# Patient Record
Sex: FEMALE | Race: WHITE | URBAN - METROPOLITAN AREA
[De-identification: names, ages, dates, MRNs, and addresses within clinical notes are randomized per-mention and may not be internally consistent; named-entity substitution may affect disease eponyms.]

---

## 2023-05-31 ENCOUNTER — NURSING HOME VISIT (OUTPATIENT)
Dept: POST ACUTE CARE | Facility: EXTERNAL LOCATION | Age: 83
End: 2023-05-31
Payer: COMMERCIAL

## 2023-05-31 DIAGNOSIS — E78.2 MIXED HYPERLIPIDEMIA: Primary | ICD-10-CM

## 2023-05-31 DIAGNOSIS — I10 PRIMARY HYPERTENSION: ICD-10-CM

## 2023-05-31 DIAGNOSIS — F33.40 RECURRENT MAJOR DEPRESSIVE DISORDER, IN REMISSION (CMS-HCC): ICD-10-CM

## 2023-05-31 DIAGNOSIS — M06.9 RHEUMATOID ARTHRITIS INVOLVING MULTIPLE SITES, UNSPECIFIED WHETHER RHEUMATOID FACTOR PRESENT (MULTI): ICD-10-CM

## 2023-05-31 DIAGNOSIS — E03.8 OTHER SPECIFIED HYPOTHYROIDISM: ICD-10-CM

## 2023-05-31 PROCEDURE — 99305 1ST NF CARE MODERATE MDM 35: CPT | Performed by: INTERNAL MEDICINE

## 2023-05-31 NOTE — LETTER
"Patient: Brooke Lam  : 1940    Encounter Date: 2023    HISTORY & PHYSICAL    Subjective  Chief complaint: Brooke Lam is a 83 y.o. female who is a acute skilled care patient being seen and evaluated for multiple medical problems.  Patient presents for weakness.    HPI:  Patient was admitted to the hospital for chest pain and anxiety. Pt feels shaky and has a \"cloudy head\". Patient's zoloft was increased to 50 mg. Patient was discharged to skilled nursing facility for continued therapy.         Past Medical History:   Diagnosis Date   • HLD (hyperlipidemia)    • Hypertension, essential    • Hypothyroid    • RA (rheumatoid arthritis) (CMS/[facility])        No past surgical history on file.    No family history on file.    Social History     Socioeconomic History   • Marital status: Single     Spouse name: Not on file   • Number of children: Not on file   • Years of education: Not on file   • Highest education level: Not on file   Occupational History   • Not on file   Tobacco Use   • Smoking status: Not on file   • Smokeless tobacco: Not on file   Substance and Sexual Activity   • Alcohol use: Not on file   • Drug use: Not on file   • Sexual activity: Not on file   Other Topics Concern   • Not on file   Social History Narrative   • Not on file     Social Determinants of Health     Financial Resource Strain: Not on file   Food Insecurity: Not on file   Transportation Needs: Not on file   Physical Activity: Not on file   Stress: Not on file   Social Connections: Not on file   Intimate Partner Violence: Not on file   Housing Stability: Not on file       Vital signs: 122/76, 97.6, 79, 18, 96%    Objective  Physical Exam  HENT:      Head: Normocephalic and atraumatic.      Nose: Nose normal.      Mouth/Throat:      Mouth: Mucous membranes are moist.      Pharynx: Oropharynx is clear.   Eyes:      Extraocular Movements: Extraocular movements intact.      Pupils: Pupils are equal, round, and reactive to " light.   Cardiovascular:      Rate and Rhythm: Normal rate and regular rhythm.   Pulmonary:      Effort: No respiratory distress.      Breath sounds: Normal breath sounds. No wheezing, rhonchi or rales.   Abdominal:      General: Bowel sounds are normal. There is no distension.      Palpations: Abdomen is soft.      Tenderness: There is no abdominal tenderness. There is no guarding.   Musculoskeletal:      Right lower leg: No edema.      Left lower leg: No edema.   Skin:     General: Skin is warm and dry.   Neurological:      Mental Status: She is alert. Mental status is at baseline.   Psychiatric:         Mood and Affect: Mood normal.         Assessment/Plan  Problem List Items Addressed This Visit       Primary hypertension    Mixed hyperlipidemia - Primary    Other specified hypothyroidism    Rheumatoid arthritis involving multiple sites (CMS/[facility])    Recurrent major depressive disorder, in remission (CMS/[facility])     Medications, treatments, and labs reviewed  Continue medications and treatments as listed in [facility]    Scribe Attestation  Maria De Jesus ALY Scribe   attest that this documentation has been prepared under the direction and in the presence of Obinna Hopkins DO.    An interactive audio and/or video telecommunication system which permits real time communications between the patient (at the originating site) and provider (at a distant site) was utilized to provide this telehealth service after obtaining verbal consent.    Provider Attestation - Scribe documentation  All medical record entries made by the Scribe were at my direction and personally dictated by me. I have reviewed the chart and agree that the record accurately reflects my personal performance of the history, physical exam, discussion and plan.    Obinna Hopkins DO          Electronically Signed By: Obinna Hopkins DO   6/29/23  2:24 PM

## 2023-06-06 ENCOUNTER — NURSING HOME VISIT (OUTPATIENT)
Dept: POST ACUTE CARE | Facility: EXTERNAL LOCATION | Age: 83
End: 2023-06-06
Payer: COMMERCIAL

## 2023-06-06 DIAGNOSIS — F41.9 ANXIETY: Primary | ICD-10-CM

## 2023-06-06 PROCEDURE — 99309 SBSQ NF CARE MODERATE MDM 30: CPT | Performed by: REGISTERED NURSE

## 2023-06-06 NOTE — LETTER
"Patient: Brooke Lam  : 1940    Encounter Date: 2023    PROGRESS NOTE    Subjective  Chief complaint: Brooke Lam is a 83 y.o. female who is a long term care patient being seen and evaluated for anxiety.    HPI:   Patient with increase in anxiety.  Patient has diagnosis of dementia and is confused.  Nurse reports that patient is exit seeking and yells repeatedly \"I don't want to be here.  I want to go home\".      Objective  Vital signs:   18, 149/71, 97.2, 78, 98%  Physical Exam  Constitutional:       General: She is not in acute distress.  Eyes:      Extraocular Movements: Extraocular movements intact.   Pulmonary:      Effort: Pulmonary effort is normal.   Musculoskeletal:      Cervical back: Neck supple.   Neurological:      Mental Status: She is alert.   Psychiatric:         Mood and Affect: Mood normal.         Behavior: Behavior is cooperative.         Assessment/Plan  Problem List Items Addressed This Visit       Anxiety - Primary     Ativan 0.25mg prn           Medications, treatments, and labs reviewed  Continue medications and treatments as listed in EMR    Scribe Attestation  INina Scribe   attest that this documentation has been prepared under the direction and in the presence of REMA Sesay    Provider Attestation - Scribe documentation  All medical record entries made by the Scribe were at my direction and personally dictated by me. I have reviewed the chart and agree that the record accurately reflects my personal performance of the history, physical exam, discussion and plan.   REMA Sesay        Electronically Signed By: REMA Sesay   23 11:08 AM  "

## 2023-06-26 NOTE — PROGRESS NOTES
"HISTORY & PHYSICAL    Subjective   Chief complaint: Brooke Lam is a 83 y.o. female who is a acute skilled care patient being seen and evaluated for multiple medical problems.  Patient presents for weakness.    HPI:  Patient was admitted to the hospital for chest pain and anxiety. Pt feels shaky and has a \"cloudy head\". Patient's zoloft was increased to 50 mg. Patient was discharged to skilled nursing facility for continued therapy.         Past Medical History:   Diagnosis Date    HLD (hyperlipidemia)     Hypertension, essential     Hypothyroid     RA (rheumatoid arthritis) (CMS/[facility])        No past surgical history on file.    No family history on file.    Social History     Socioeconomic History    Marital status: Single     Spouse name: Not on file    Number of children: Not on file    Years of education: Not on file    Highest education level: Not on file   Occupational History    Not on file   Tobacco Use    Smoking status: Not on file    Smokeless tobacco: Not on file   Substance and Sexual Activity    Alcohol use: Not on file    Drug use: Not on file    Sexual activity: Not on file   Other Topics Concern    Not on file   Social History Narrative    Not on file     Social Determinants of Health     Financial Resource Strain: Not on file   Food Insecurity: Not on file   Transportation Needs: Not on file   Physical Activity: Not on file   Stress: Not on file   Social Connections: Not on file   Intimate Partner Violence: Not on file   Housing Stability: Not on file       Vital signs: 122/76, 97.6, 79, 18, 96%    Objective   Physical Exam  HENT:      Head: Normocephalic and atraumatic.      Nose: Nose normal.      Mouth/Throat:      Mouth: Mucous membranes are moist.      Pharynx: Oropharynx is clear.   Eyes:      Extraocular Movements: Extraocular movements intact.      Pupils: Pupils are equal, round, and reactive to light.   Cardiovascular:      Rate and Rhythm: Normal rate and regular rhythm.   Pulmonary: "      Effort: No respiratory distress.      Breath sounds: Normal breath sounds. No wheezing, rhonchi or rales.   Abdominal:      General: Bowel sounds are normal. There is no distension.      Palpations: Abdomen is soft.      Tenderness: There is no abdominal tenderness. There is no guarding.   Musculoskeletal:      Right lower leg: No edema.      Left lower leg: No edema.   Skin:     General: Skin is warm and dry.   Neurological:      Mental Status: She is alert. Mental status is at baseline.   Psychiatric:         Mood and Affect: Mood normal.         Assessment/Plan   Problem List Items Addressed This Visit       Primary hypertension    Mixed hyperlipidemia - Primary    Other specified hypothyroidism    Rheumatoid arthritis involving multiple sites (CMS/[facility])    Recurrent major depressive disorder, in remission (CMS/[facility])     Medications, treatments, and labs reviewed  Continue medications and treatments as listed in [facility]    Scribe Attestation  I, Solomon Holguin   attest that this documentation has been prepared under the direction and in the presence of Obinna Hopkins DO.    An interactive audio and/or video telecommunication system which permits real time communications between the patient (at the originating site) and provider (at a distant site) was utilized to provide this telehealth service after obtaining verbal consent.    Provider Attestation - Scribe documentation  All medical record entries made by the Scribe were at my direction and personally dictated by me. I have reviewed the chart and agree that the record accurately reflects my personal performance of the history, physical exam, discussion and plan.    Obinna Hopkins DO

## 2023-06-29 PROBLEM — F33.40 RECURRENT MAJOR DEPRESSIVE DISORDER, IN REMISSION (CMS-HCC): Status: ACTIVE | Noted: 2023-06-29

## 2023-06-29 PROBLEM — M06.9 RHEUMATOID ARTHRITIS INVOLVING MULTIPLE SITES (MULTI): Status: ACTIVE | Noted: 2023-06-29

## 2023-06-29 PROBLEM — I10 PRIMARY HYPERTENSION: Status: ACTIVE | Noted: 2023-06-29

## 2023-06-29 PROBLEM — E03.8 OTHER SPECIFIED HYPOTHYROIDISM: Status: ACTIVE | Noted: 2023-06-29

## 2023-06-29 PROBLEM — E78.2 MIXED HYPERLIPIDEMIA: Status: ACTIVE | Noted: 2023-06-29

## 2023-07-06 PROBLEM — F41.9 ANXIETY: Status: ACTIVE | Noted: 2023-07-06

## 2023-08-31 ENCOUNTER — NURSING HOME VISIT (OUTPATIENT)
Dept: POST ACUTE CARE | Facility: EXTERNAL LOCATION | Age: 83
End: 2023-08-31
Payer: COMMERCIAL

## 2023-08-31 DIAGNOSIS — F33.40 RECURRENT MAJOR DEPRESSIVE DISORDER, IN REMISSION (CMS-HCC): ICD-10-CM

## 2023-08-31 DIAGNOSIS — I10 PRIMARY HYPERTENSION: ICD-10-CM

## 2023-08-31 DIAGNOSIS — E03.8 OTHER SPECIFIED HYPOTHYROIDISM: Primary | ICD-10-CM

## 2023-08-31 DIAGNOSIS — M06.9 RHEUMATOID ARTHRITIS INVOLVING MULTIPLE SITES, UNSPECIFIED WHETHER RHEUMATOID FACTOR PRESENT (MULTI): ICD-10-CM

## 2023-08-31 PROCEDURE — 99309 SBSQ NF CARE MODERATE MDM 30: CPT

## 2023-08-31 NOTE — LETTER
Patient: Brooke Lam  : 1940    Encounter Date: 2023    PROGRESS NOTE    Subjective  Chief complaint: Brooke Lam is a 83 y.o. female who is a long term care patient being seen and evaluated for  general medical care and monthly follow-up    HPI:  Patient seen and evaluated for general medical care monthly follow-up.  Patient states doing well today.  Offers no complaints of pain.  History depression- no SI HI or self isolation.  Patient interacting with others.  Mood stable.  RA pain managed with Tylenol, Mobic.  No concerns per nursing          Objective  Vital signs:  143/75-97.3-52-18-97%    Physical Exam  Constitutional:       Appearance: Normal appearance.   HENT:      Head: Normocephalic.      Mouth/Throat:      Mouth: Mucous membranes are moist.   Eyes:      Extraocular Movements: Extraocular movements intact.   Cardiovascular:      Rate and Rhythm: Normal rate and regular rhythm.      Pulses: Normal pulses.      Heart sounds: Normal heart sounds.   Pulmonary:      Effort: Pulmonary effort is normal.      Breath sounds: Normal breath sounds.   Abdominal:      General: Bowel sounds are normal.      Palpations: Abdomen is soft.   Musculoskeletal:         General: Normal range of motion.      Cervical back: Normal range of motion and neck supple.      Comments:  generalized weakness   Skin:     General: Skin is warm and dry.      Capillary Refill: Capillary refill takes less than 2 seconds.   Neurological:      Mental Status: She is alert and oriented to person, place, and time. Mental status is at baseline.   Psychiatric:         Mood and Affect: Mood normal.         Behavior: Behavior normal.         Assessment/Plan  Problem List Items Addressed This Visit       Primary hypertension      Stable   BP within range   monitor         Other specified hypothyroidism - Primary      Stable   no disruptions in sleep patterns or appetite   no diarrhea/constipation, hot/cold intolerance    Synthroid   monitor TSH         Rheumatoid arthritis involving multiple sites (CMS/[facility])      Stable   pain managed with Tylenol Mobic   Megestrol   monitor         Recurrent major depressive disorder, in remission (CMS/[facility])      Stable   no SI HI or self isolation   engages with others   Zoloft   monitor          Medications, treatments, and labs reviewed  Continue medications and treatments as listed in EMR    REMA Gastelum      Electronically Signed By: REMA Gastelum   9/10/23  7:20 PM

## 2023-09-10 NOTE — ASSESSMENT & PLAN NOTE
Stable   no disruptions in sleep patterns or appetite   no diarrhea/constipation, hot/cold intolerance   Synthroid   monitor TSH

## 2023-09-10 NOTE — PROGRESS NOTES
PROGRESS NOTE    Subjective   Chief complaint: Brooke Lam is a 83 y.o. female who is a long term care patient being seen and evaluated for  general medical care and monthly follow-up    HPI:  Patient seen and evaluated for general medical care monthly follow-up.  Patient states doing well today.  Offers no complaints of pain.  History depression- no SI HI or self isolation.  Patient interacting with others.  Mood stable.  RA pain managed with Tylenol, Mobic.  No concerns per nursing          Objective   Vital signs:  143/75-97.3-52-18-97%    Physical Exam  Constitutional:       Appearance: Normal appearance.   HENT:      Head: Normocephalic.      Mouth/Throat:      Mouth: Mucous membranes are moist.   Eyes:      Extraocular Movements: Extraocular movements intact.   Cardiovascular:      Rate and Rhythm: Normal rate and regular rhythm.      Pulses: Normal pulses.      Heart sounds: Normal heart sounds.   Pulmonary:      Effort: Pulmonary effort is normal.      Breath sounds: Normal breath sounds.   Abdominal:      General: Bowel sounds are normal.      Palpations: Abdomen is soft.   Musculoskeletal:         General: Normal range of motion.      Cervical back: Normal range of motion and neck supple.      Comments:  generalized weakness   Skin:     General: Skin is warm and dry.      Capillary Refill: Capillary refill takes less than 2 seconds.   Neurological:      Mental Status: She is alert and oriented to person, place, and time. Mental status is at baseline.   Psychiatric:         Mood and Affect: Mood normal.         Behavior: Behavior normal.         Assessment/Plan   Problem List Items Addressed This Visit       Primary hypertension      Stable   BP within range   monitor         Other specified hypothyroidism - Primary      Stable   no disruptions in sleep patterns or appetite   no diarrhea/constipation, hot/cold intolerance   Synthroid   monitor TSH         Rheumatoid arthritis involving multiple sites  (CMS/[facility])      Stable   pain managed with Tylenol Mobic   Megestrol   monitor         Recurrent major depressive disorder, in remission (CMS/[facility])      Stable   no SI HI or self isolation   engages with others   Zoloft   monitor          Medications, treatments, and labs reviewed  Continue medications and treatments as listed in EMR    Maritza Denton, APRN-CNP

## 2023-09-11 ENCOUNTER — NURSING HOME VISIT (OUTPATIENT)
Dept: POST ACUTE CARE | Facility: EXTERNAL LOCATION | Age: 83
End: 2023-09-11
Payer: COMMERCIAL

## 2023-09-11 DIAGNOSIS — F41.9 ANXIETY: Primary | ICD-10-CM

## 2023-09-11 PROCEDURE — 99308 SBSQ NF CARE LOW MDM 20: CPT | Performed by: REGISTERED NURSE

## 2023-09-11 NOTE — LETTER
Patient: Brooke Lam  : 1940    Encounter Date: 2023    PROGRESS NOTE    Subjective  Chief complaint: Brooke Lam is a 83 y.o. female who is a long term care patient being seen and evaluated for follow up.     HPI:  23 Patient seen and evaluated for general medical care monthly follow-up.  Patient states doing well today.  Offers no complaints of pain.  History depression- no SI HI or self isolation.  Patient interacting with others.  Mood stable.  RA pain managed with Tylenol, Mobic.  No concerns per nursing    23 Patient was seen and evaluated at bedside today for a follow up. Patient has no new nursing concerns. Patient denies n/v/f/c.       Objective  Vital signs: 120/67, 97.9, 82, 99%    Physical Exam  Constitutional:       General: She is not in acute distress.  Eyes:      Extraocular Movements: Extraocular movements intact.   Pulmonary:      Effort: Pulmonary effort is normal.   Musculoskeletal:      Cervical back: Neck supple.   Neurological:      Mental Status: She is alert.   Psychiatric:         Mood and Affect: Mood normal.         Behavior: Behavior is cooperative.         Assessment/Plan  Problem List Items Addressed This Visit       Anxiety - Primary     Seen for med review okay to continue Ativan          Medications, treatments, and labs reviewed  Continue medications and treatments as listed in [facility]    Scribe Attestation  I, Solomon Holguin   attest that this documentation has been prepared under the direction and in the presence of REMA Sesay.    Provider Attestation - Scribe documentation  All medical record entries made by the Scribe were at my direction and personally dictated by me. I have reviewed the chart and agree that the record accurately reflects my personal performance of the history, physical exam, discussion and plan.    REMA Sesay          Electronically Signed By: REMA Sesay    10/14/23  8:58 AM

## 2023-09-25 ENCOUNTER — NURSING HOME VISIT (OUTPATIENT)
Dept: POST ACUTE CARE | Facility: EXTERNAL LOCATION | Age: 83
End: 2023-09-25
Payer: COMMERCIAL

## 2023-09-25 DIAGNOSIS — I10 PRIMARY HYPERTENSION: ICD-10-CM

## 2023-09-25 DIAGNOSIS — M06.9 RHEUMATOID ARTHRITIS INVOLVING MULTIPLE SITES, UNSPECIFIED WHETHER RHEUMATOID FACTOR PRESENT (MULTI): ICD-10-CM

## 2023-09-25 DIAGNOSIS — F33.40 RECURRENT MAJOR DEPRESSIVE DISORDER, IN REMISSION (CMS-HCC): ICD-10-CM

## 2023-09-25 DIAGNOSIS — F41.9 ANXIETY: Primary | ICD-10-CM

## 2023-09-25 PROCEDURE — 99309 SBSQ NF CARE MODERATE MDM 30: CPT | Performed by: REGISTERED NURSE

## 2023-09-25 NOTE — LETTER
Patient: Brooke Lam  : 1940    Encounter Date: 2023    PROGRESS NOTE    Subjective  Chief complaint: Brooke Lam is a 83 y.o. female patient being seen and evaluated for monthly general medical care and follow-up    HPI:  23 Patient presents for general medical care and f/u.  Patient seen and examined at bedside.  No issues per nursing.  Patient has no acute complaints.          Objective  Vital signs: 143/75, 97.3, 52, 97%    Physical Exam  Constitutional:       General: She is not in acute distress.  Eyes:      Extraocular Movements: Extraocular movements intact.   Pulmonary:      Effort: Pulmonary effort is normal.   Musculoskeletal:      Cervical back: Neck supple.   Neurological:      Mental Status: She is alert.   Psychiatric:         Mood and Affect: Mood normal.         Behavior: Behavior is cooperative.         Assessment/Plan  Problem List Items Addressed This Visit       Primary hypertension      Stable   BP within range   monitor         Rheumatoid arthritis involving multiple sites (CMS/[facility])      Stable   pain managed with Tylenol Mobic   Megestrol   monitor          Recurrent major depressive disorder, in remission (CMS/[facility])      Stable   no SI HI or self isolation   engages with others   Zoloft   monitor         Anxiety - Primary     Ativan 0.25mg prn           Medications, treatments, and labs reviewed  Continue medications and treatments as listed in EMR    Scribe Attestation  IMaria De Jesus Scribe   attest that this documentation has been prepared under the direction and in the presence of REMA Sesay.    Provider Attestation - Scribe documentation  All medical record entries made by the Scribe were at my direction and personally dictated by me. I have reviewed the chart and agree that the record accurately reflects my personal performance of the history, physical exam, discussion and plan.    REMA Sesay      Electronically  Signed By: Obinna Gonzales, PAULA-CNP   10/12/23 10:51 AM

## 2023-10-02 ENCOUNTER — LAB REQUISITION (OUTPATIENT)
Dept: LAB | Facility: HOSPITAL | Age: 83
End: 2023-10-02
Payer: COMMERCIAL

## 2023-10-02 DIAGNOSIS — R41.82 ALTERED MENTAL STATUS, UNSPECIFIED: ICD-10-CM

## 2023-10-02 DIAGNOSIS — D64.9 ANEMIA, UNSPECIFIED: ICD-10-CM

## 2023-10-02 LAB
ANION GAP SERPL CALC-SCNC: 11 MMOL/L (ref 10–20)
BUN SERPL-MCNC: 20 MG/DL (ref 6–23)
CALCIUM SERPL-MCNC: 9.5 MG/DL (ref 8.6–10.3)
CHLORIDE SERPL-SCNC: 103 MMOL/L (ref 98–107)
CO2 SERPL-SCNC: 27 MMOL/L (ref 21–32)
CREAT SERPL-MCNC: 0.97 MG/DL (ref 0.5–1.05)
ERYTHROCYTE [DISTWIDTH] IN BLOOD BY AUTOMATED COUNT: 14.5 % (ref 11.5–14.5)
GFR SERPL CREATININE-BSD FRML MDRD: 58 ML/MIN/1.73M*2
GLUCOSE SERPL-MCNC: 158 MG/DL (ref 74–99)
HCT VFR BLD AUTO: 41.6 % (ref 36–46)
HGB BLD-MCNC: 13.5 G/DL (ref 12–16)
MCH RBC QN AUTO: 30.8 PG (ref 26–34)
MCHC RBC AUTO-ENTMCNC: 32.5 G/DL (ref 32–36)
MCV RBC AUTO: 95 FL (ref 80–100)
NRBC BLD-RTO: 0 /100 WBCS (ref 0–0)
PLATELET # BLD AUTO: 268 X10*3/UL (ref 150–450)
PMV BLD AUTO: 10.8 FL (ref 7.5–11.5)
POTASSIUM SERPL-SCNC: 3.9 MMOL/L (ref 3.5–5.3)
RBC # BLD AUTO: 4.38 X10*6/UL (ref 4–5.2)
SODIUM SERPL-SCNC: 137 MMOL/L (ref 136–145)
WBC # BLD AUTO: 4.6 X10*3/UL (ref 4.4–11.3)

## 2023-10-02 PROCEDURE — 85027 COMPLETE CBC AUTOMATED: CPT

## 2023-10-02 PROCEDURE — 80048 BASIC METABOLIC PNL TOTAL CA: CPT

## 2023-10-04 NOTE — PROGRESS NOTES
PROGRESS NOTE    Subjective   Chief complaint: Brooke Lam is a 83 y.o. female who is a long term care patient being seen and evaluated for follow up.     HPI:  8/31/23 Patient seen and evaluated for general medical care monthly follow-up.  Patient states doing well today.  Offers no complaints of pain.  History depression- no SI HI or self isolation.  Patient interacting with others.  Mood stable.  RA pain managed with Tylenol, Mobic.  No concerns per nursing    9/11/23 Patient was seen and evaluated at bedside today for a follow up. Patient has no new nursing concerns. Patient denies n/v/f/c.       Objective   Vital signs: 120/67, 97.9, 82, 99%    Physical Exam  Constitutional:       General: She is not in acute distress.  Eyes:      Extraocular Movements: Extraocular movements intact.   Pulmonary:      Effort: Pulmonary effort is normal.   Musculoskeletal:      Cervical back: Neck supple.   Neurological:      Mental Status: She is alert.   Psychiatric:         Mood and Affect: Mood normal.         Behavior: Behavior is cooperative.         Assessment/Plan   Problem List Items Addressed This Visit       Anxiety - Primary     Seen for med review okay to continue Ativan          Medications, treatments, and labs reviewed  Continue medications and treatments as listed in [facility]    Scribe Attestation  IMaria De Jesus Scribe   attest that this documentation has been prepared under the direction and in the presence of REMA Sesay.    Provider Attestation - Scribe documentation  All medical record entries made by the Scribe were at my direction and personally dictated by me. I have reviewed the chart and agree that the record accurately reflects my personal performance of the history, physical exam, discussion and plan.    REMA Sesay

## 2023-10-10 NOTE — PROGRESS NOTES
PROGRESS NOTE    Subjective   Chief complaint: Brooke Lam is a 83 y.o. female patient being seen and evaluated for monthly general medical care and follow-up    HPI:  9/25/23 Patient presents for general medical care and f/u.  Patient seen and examined at bedside.  No issues per nursing.  Patient has no acute complaints.          Objective   Vital signs: 143/75, 97.3, 52, 97%    Physical Exam  Constitutional:       General: She is not in acute distress.  Eyes:      Extraocular Movements: Extraocular movements intact.   Pulmonary:      Effort: Pulmonary effort is normal.   Musculoskeletal:      Cervical back: Neck supple.   Neurological:      Mental Status: She is alert.   Psychiatric:         Mood and Affect: Mood normal.         Behavior: Behavior is cooperative.         Assessment/Plan   Problem List Items Addressed This Visit       Primary hypertension      Stable   BP within range   monitor         Rheumatoid arthritis involving multiple sites (CMS/[facility])      Stable   pain managed with Tylenol Mobic   Megestrol   monitor          Recurrent major depressive disorder, in remission (CMS/[facility])      Stable   no SI HI or self isolation   engages with others   Zoloft   monitor         Anxiety - Primary     Ativan 0.25mg prn           Medications, treatments, and labs reviewed  Continue medications and treatments as listed in EMR    Scribe Attestation  I, Solomon Holguin   attest that this documentation has been prepared under the direction and in the presence of REMA Sesay.    Provider Attestation - Scribe documentation  All medical record entries made by the Scribe were at my direction and personally dictated by me. I have reviewed the chart and agree that the record accurately reflects my personal performance of the history, physical exam, discussion and plan.    REMA Sesay

## 2023-10-16 ENCOUNTER — NURSING HOME VISIT (OUTPATIENT)
Dept: POST ACUTE CARE | Facility: EXTERNAL LOCATION | Age: 83
End: 2023-10-16
Payer: COMMERCIAL

## 2023-10-16 DIAGNOSIS — F33.40 RECURRENT MAJOR DEPRESSIVE DISORDER, IN REMISSION (CMS-HCC): ICD-10-CM

## 2023-10-16 DIAGNOSIS — I10 PRIMARY HYPERTENSION: ICD-10-CM

## 2023-10-16 DIAGNOSIS — M06.9 RHEUMATOID ARTHRITIS INVOLVING MULTIPLE SITES, UNSPECIFIED WHETHER RHEUMATOID FACTOR PRESENT (MULTI): ICD-10-CM

## 2023-10-16 DIAGNOSIS — F41.9 ANXIETY: Primary | ICD-10-CM

## 2023-10-16 DIAGNOSIS — E78.2 MIXED HYPERLIPIDEMIA: ICD-10-CM

## 2023-10-16 DIAGNOSIS — E03.8 OTHER SPECIFIED HYPOTHYROIDISM: ICD-10-CM

## 2023-10-16 PROCEDURE — 99305 1ST NF CARE MODERATE MDM 35: CPT | Performed by: INTERNAL MEDICINE

## 2023-10-16 NOTE — LETTER
Patient: Brooke Lam  : 1940    Encounter Date: 10/16/2023    HISTORY & PHYSICAL    Subjective  Chief complaint: Brooke Lam is a 83 y.o. female who is a long term resident patient being seen and evaluated for multiple medical problems.  Patient presents for weakness.    HPI:  Patient is a 83 year old female with a past medical history of dementia, GERD, anxiety, depression, HLD, and weakness. Patient was admitted for dementia. Patient is a long term care resident.         Past Medical History:   Diagnosis Date   • HLD (hyperlipidemia)    • Hypertension, essential    • Hypothyroid    • RA (rheumatoid arthritis) (CMS/[facility])        No past surgical history on file.    No family history on file.    Social History     Socioeconomic History   • Marital status: Single     Spouse name: Not on file   • Number of children: Not on file   • Years of education: Not on file   • Highest education level: Not on file   Occupational History   • Not on file   Tobacco Use   • Smoking status: Not on file   • Smokeless tobacco: Not on file   Substance and Sexual Activity   • Alcohol use: Not on file   • Drug use: Not on file   • Sexual activity: Not on file   Other Topics Concern   • Not on file   Social History Narrative   • Not on file     Social Determinants of Health     Financial Resource Strain: Not on file   Food Insecurity: Not on file   Transportation Needs: Not on file   Physical Activity: Not on file   Stress: Not on file   Social Connections: Not on file   Intimate Partner Violence: Not on file   Housing Stability: Not on file       Vital signs: 149/81, 98.3, 94, 18, 96%    Objective  Physical Exam  HENT:      Head: Normocephalic and atraumatic.      Nose: Nose normal.      Mouth/Throat:      Mouth: Mucous membranes are moist.      Pharynx: Oropharynx is clear.   Eyes:      Extraocular Movements: Extraocular movements intact.      Pupils: Pupils are equal, round, and reactive to light.   Cardiovascular:       Rate and Rhythm: Normal rate and regular rhythm.   Pulmonary:      Effort: No respiratory distress.      Breath sounds: Normal breath sounds. No wheezing, rhonchi or rales.   Abdominal:      General: Bowel sounds are normal. There is no distension.      Palpations: Abdomen is soft.      Tenderness: There is no abdominal tenderness. There is no guarding.   Musculoskeletal:      Right lower leg: No edema.      Left lower leg: No edema.   Skin:     General: Skin is warm and dry.   Neurological:      Mental Status: She is alert. Mental status is at baseline.         Assessment/Plan  Problem List Items Addressed This Visit       Primary hypertension    Mixed hyperlipidemia    Other specified hypothyroidism    Rheumatoid arthritis involving multiple sites (CMS/[facility])    Recurrent major depressive disorder, in remission (CMS/[facility])    Anxiety - Primary     Medications, treatments, and labs reviewed  Continue medications and treatments as listed in [facility]    Scribe Attestation  IMaria De Jesus Scribe   attest that this documentation has been prepared under the direction and in the presence of Obinna Hopkins DO.    An interactive audio and/or video telecommunication system which permits real time communications between the patient (at the originating site) and provider (at a distant site) was utilized to provide this telehealth service after obtaining verbal consent.    Provider Attestation - Scribe documentation  All medical record entries made by the Scribe were at my direction and personally dictated by me. I have reviewed the chart and agree that the record accurately reflects my personal performance of the history, physical exam, discussion and plan.    Obinna Hopkins DO          Electronically Signed By: Obinna Hopkins DO   11/9/23  8:06 PM

## 2023-10-17 NOTE — PROGRESS NOTES
HISTORY & PHYSICAL    Subjective   Chief complaint: Brooke Lam is a 83 y.o. female who is a long term resident patient being seen and evaluated for multiple medical problems.  Patient presents for weakness.    HPI:  Patient is a 83 year old female with a past medical history of dementia, GERD, anxiety, depression, HLD, and weakness. Patient was admitted for dementia. Patient is a long term care resident.         Past Medical History:   Diagnosis Date    HLD (hyperlipidemia)     Hypertension, essential     Hypothyroid     RA (rheumatoid arthritis) (CMS/[facility])        No past surgical history on file.    No family history on file.    Social History     Socioeconomic History    Marital status: Single     Spouse name: Not on file    Number of children: Not on file    Years of education: Not on file    Highest education level: Not on file   Occupational History    Not on file   Tobacco Use    Smoking status: Not on file    Smokeless tobacco: Not on file   Substance and Sexual Activity    Alcohol use: Not on file    Drug use: Not on file    Sexual activity: Not on file   Other Topics Concern    Not on file   Social History Narrative    Not on file     Social Determinants of Health     Financial Resource Strain: Not on file   Food Insecurity: Not on file   Transportation Needs: Not on file   Physical Activity: Not on file   Stress: Not on file   Social Connections: Not on file   Intimate Partner Violence: Not on file   Housing Stability: Not on file       Vital signs: 149/81, 98.3, 94, 18, 96%    Objective   Physical Exam  HENT:      Head: Normocephalic and atraumatic.      Nose: Nose normal.      Mouth/Throat:      Mouth: Mucous membranes are moist.      Pharynx: Oropharynx is clear.   Eyes:      Extraocular Movements: Extraocular movements intact.      Pupils: Pupils are equal, round, and reactive to light.   Cardiovascular:      Rate and Rhythm: Normal rate and regular rhythm.   Pulmonary:      Effort: No respiratory  distress.      Breath sounds: Normal breath sounds. No wheezing, rhonchi or rales.   Abdominal:      General: Bowel sounds are normal. There is no distension.      Palpations: Abdomen is soft.      Tenderness: There is no abdominal tenderness. There is no guarding.   Musculoskeletal:      Right lower leg: No edema.      Left lower leg: No edema.   Skin:     General: Skin is warm and dry.   Neurological:      Mental Status: She is alert. Mental status is at baseline.         Assessment/Plan   Problem List Items Addressed This Visit       Primary hypertension    Mixed hyperlipidemia    Other specified hypothyroidism    Rheumatoid arthritis involving multiple sites (CMS/[facility])    Recurrent major depressive disorder, in remission (CMS/[facility])    Anxiety - Primary     Medications, treatments, and labs reviewed  Continue medications and treatments as listed in [facility]    Scribe Attestation  Maria De Jesus AYL Scribe   attest that this documentation has been prepared under the direction and in the presence of Obinna Hopkins DO.    An interactive audio and/or video telecommunication system which permits real time communications between the patient (at the originating site) and provider (at a distant site) was utilized to provide this telehealth service after obtaining verbal consent.    Provider Attestation - Scribe documentation  All medical record entries made by the Scribe were at my direction and personally dictated by me. I have reviewed the chart and agree that the record accurately reflects my personal performance of the history, physical exam, discussion and plan.    Obinna Hopkins DO

## 2023-10-30 ENCOUNTER — NURSING HOME VISIT (OUTPATIENT)
Dept: POST ACUTE CARE | Facility: EXTERNAL LOCATION | Age: 83
End: 2023-10-30
Payer: COMMERCIAL

## 2023-10-30 DIAGNOSIS — F41.9 ANXIETY: ICD-10-CM

## 2023-10-30 DIAGNOSIS — M06.9 RHEUMATOID ARTHRITIS INVOLVING MULTIPLE SITES, UNSPECIFIED WHETHER RHEUMATOID FACTOR PRESENT (MULTI): Primary | ICD-10-CM

## 2023-10-30 DIAGNOSIS — F33.40 RECURRENT MAJOR DEPRESSIVE DISORDER, IN REMISSION (CMS-HCC): ICD-10-CM

## 2023-10-30 DIAGNOSIS — I10 PRIMARY HYPERTENSION: ICD-10-CM

## 2023-10-30 PROCEDURE — 99309 SBSQ NF CARE MODERATE MDM 30: CPT | Performed by: REGISTERED NURSE

## 2023-10-30 NOTE — LETTER
Patient: Brooke Lam  : 1940    Encounter Date: 10/30/2023    PROGRESS NOTE    Subjective  Chief complaint: Brooke Lam is a 83 y.o. female patient being seen and evaluated for monthly general medical care and follow-up    HPI:  10/30/23 Patient presents for general medical care and f/u.  Patient seen and examined at bedside.  No issues per nursing.  Patient has no acute complaints.        Objective  Vital signs: 114/74, 97.5, 72, 98%    Physical Exam  Constitutional:       General: She is not in acute distress.  Eyes:      Extraocular Movements: Extraocular movements intact.   Pulmonary:      Effort: Pulmonary effort is normal.   Musculoskeletal:      Cervical back: Neck supple.   Neurological:      Mental Status: She is alert.   Psychiatric:         Mood and Affect: Mood normal.         Behavior: Behavior is cooperative.         Assessment/Plan  Problem List Items Addressed This Visit       Primary hypertension      Stable   BP within range   monitor         Rheumatoid arthritis involving multiple sites (CMS/[facility]) - Primary      Stable   pain managed with Tylenol Mobic   Megestrol   monitor          Recurrent major depressive disorder, in remission (CMS/[facility])      Stable   no SI HI or self isolation   engages with others   Zoloft   monitor         Anxiety     continue Ativan          Medications, treatments, and labs reviewed  Continue medications and treatments as listed in EMR    Scribe Attestation  IMaria De Jesus Scribe   attest that this documentation has been prepared under the direction and in the presence of REMA Sesay.    Provider Attestation - Scribe documentation  All medical record entries made by the Scribe were at my direction and personally dictated by me. I have reviewed the chart and agree that the record accurately reflects my personal performance of the history, physical exam, discussion and plan.    REMA Sesay      Electronically  Signed By: Obinna Gonzales, PAULA-POONAM   11/13/23  2:02 PM

## 2023-11-07 NOTE — PROGRESS NOTES
PROGRESS NOTE    Subjective   Chief complaint: Brooke Lam is a 83 y.o. female patient being seen and evaluated for monthly general medical care and follow-up    HPI:  10/30/23 Patient presents for general medical care and f/u.  Patient seen and examined at bedside.  No issues per nursing.  Patient has no acute complaints.        Objective   Vital signs: 114/74, 97.5, 72, 98%    Physical Exam  Constitutional:       General: She is not in acute distress.  Eyes:      Extraocular Movements: Extraocular movements intact.   Pulmonary:      Effort: Pulmonary effort is normal.   Musculoskeletal:      Cervical back: Neck supple.   Neurological:      Mental Status: She is alert.   Psychiatric:         Mood and Affect: Mood normal.         Behavior: Behavior is cooperative.         Assessment/Plan   Problem List Items Addressed This Visit       Primary hypertension      Stable   BP within range   monitor         Rheumatoid arthritis involving multiple sites (CMS/[facility]) - Primary      Stable   pain managed with Tylenol Mobic   Megestrol   monitor          Recurrent major depressive disorder, in remission (CMS/[facility])      Stable   no SI HI or self isolation   engages with others   Zoloft   monitor         Anxiety     continue Ativan          Medications, treatments, and labs reviewed  Continue medications and treatments as listed in EMR    Scribe Attestation  I, Solomon Holguin   attest that this documentation has been prepared under the direction and in the presence of REMA Sesay.    Provider Attestation - Scribe documentation  All medical record entries made by the Scribe were at my direction and personally dictated by me. I have reviewed the chart and agree that the record accurately reflects my personal performance of the history, physical exam, discussion and plan.    REMA Sesay

## 2023-11-08 ENCOUNTER — NURSING HOME VISIT (OUTPATIENT)
Dept: POST ACUTE CARE | Facility: EXTERNAL LOCATION | Age: 83
End: 2023-11-08
Payer: COMMERCIAL

## 2023-11-08 DIAGNOSIS — F33.40 RECURRENT MAJOR DEPRESSIVE DISORDER, IN REMISSION (CMS-HCC): ICD-10-CM

## 2023-11-08 DIAGNOSIS — E03.8 OTHER SPECIFIED HYPOTHYROIDISM: ICD-10-CM

## 2023-11-08 DIAGNOSIS — N18.32 STAGE 3B CHRONIC KIDNEY DISEASE (MULTI): Primary | ICD-10-CM

## 2023-11-08 DIAGNOSIS — I10 PRIMARY HYPERTENSION: ICD-10-CM

## 2023-11-08 DIAGNOSIS — E78.2 MIXED HYPERLIPIDEMIA: ICD-10-CM

## 2023-11-08 DIAGNOSIS — M06.9 RHEUMATOID ARTHRITIS INVOLVING MULTIPLE SITES, UNSPECIFIED WHETHER RHEUMATOID FACTOR PRESENT (MULTI): ICD-10-CM

## 2023-11-08 DIAGNOSIS — F41.9 ANXIETY: ICD-10-CM

## 2023-11-08 PROCEDURE — 99305 1ST NF CARE MODERATE MDM 35: CPT | Performed by: INTERNAL MEDICINE

## 2023-11-08 NOTE — LETTER
Patient: Brooke Lam  : 1940    Encounter Date: 2023    HISTORY & PHYSICAL    Subjective  Chief complaint: Brooke Lam is a 83 y.o. female who is a long term resident patient being seen and evaluated for multiple medical problems.  Patient presents for weakness.    HPI:  Patient is a 83 year old female with dementia. Patient was admitted to the nursing facility after being in the hospital. Patient presented to the ED for metabolic encephalopathy. Patient's condition improved on IV antibiotics. Patient was discharged back to the nursing facility.         Past Medical History:   Diagnosis Date   • HLD (hyperlipidemia)    • Hypertension, essential    • Hypothyroid    • RA (rheumatoid arthritis) (CMS/[facility])        No past surgical history on file.    No family history on file.    Social History     Socioeconomic History   • Marital status: Single     Spouse name: Not on file   • Number of children: Not on file   • Years of education: Not on file   • Highest education level: Not on file   Occupational History   • Not on file   Tobacco Use   • Smoking status: Not on file   • Smokeless tobacco: Not on file   Substance and Sexual Activity   • Alcohol use: Not on file   • Drug use: Not on file   • Sexual activity: Not on file   Other Topics Concern   • Not on file   Social History Narrative   • Not on file     Social Determinants of Health     Financial Resource Strain: Not on file   Food Insecurity: Not on file   Transportation Needs: Not on file   Physical Activity: Not on file   Stress: Not on file   Social Connections: Not on file   Intimate Partner Violence: Not on file   Housing Stability: Not on file       Vital signs: 138/66, 97.5, 68, 18, 97%    Objective  Physical Exam  HENT:      Head: Normocephalic and atraumatic.      Nose: Nose normal.      Mouth/Throat:      Mouth: Mucous membranes are moist.      Pharynx: Oropharynx is clear.   Eyes:      Extraocular Movements: Extraocular movements  intact.      Pupils: Pupils are equal, round, and reactive to light.   Cardiovascular:      Rate and Rhythm: Normal rate and regular rhythm.   Pulmonary:      Effort: No respiratory distress.      Breath sounds: Normal breath sounds. No wheezing, rhonchi or rales.   Abdominal:      General: Bowel sounds are normal. There is no distension.      Palpations: Abdomen is soft.      Tenderness: There is no abdominal tenderness. There is no guarding.   Musculoskeletal:      Right lower leg: No edema.      Left lower leg: No edema.   Skin:     General: Skin is warm and dry.   Neurological:      Mental Status: She is alert. Mental status is at baseline.         Assessment/Plan  Problem List Items Addressed This Visit       Primary hypertension     Continue current medications         Mixed hyperlipidemia     Continue current medications         Other specified hypothyroidism     Continue current medications         Rheumatoid arthritis involving multiple sites (CMS/[facility])     Continue current medications         Recurrent major depressive disorder, in remission (CMS/[facility])     Continue current medications         Anxiety     Continue current medications         Stage 3b chronic kidney disease (CMS/[facility]) - Primary     Medications, treatments, and labs reviewed  Continue medications and treatments as listed in [facility]    Scribe Attestation  IMaria De Jesus Scribe   attest that this documentation has been prepared under the direction and in the presence of Obinna Hopkins DO.    Provider Attestation - Scribe documentation  All medical record entries made by the Scribe were at my direction and personally dictated by me. I have reviewed the chart and agree that the record accurately reflects my personal performance of the history, physical exam, discussion and plan.    Obinna Hopkins DO          Electronically Signed By: Obinna Hopkins DO   11/28/23 10:53 PM

## 2023-11-22 NOTE — PROGRESS NOTES
HISTORY & PHYSICAL    Subjective   Chief complaint: Brooke Lam is a 83 y.o. female who is a long term resident patient being seen and evaluated for multiple medical problems.  Patient presents for weakness.    HPI:  Patient is a 83 year old female with dementia. Patient was admitted to the nursing facility after being in the hospital. Patient presented to the ED for metabolic encephalopathy. Patient's condition improved on IV antibiotics. Patient was discharged back to the nursing facility.         Past Medical History:   Diagnosis Date    HLD (hyperlipidemia)     Hypertension, essential     Hypothyroid     RA (rheumatoid arthritis) (CMS/[facility])        No past surgical history on file.    No family history on file.    Social History     Socioeconomic History    Marital status: Single     Spouse name: Not on file    Number of children: Not on file    Years of education: Not on file    Highest education level: Not on file   Occupational History    Not on file   Tobacco Use    Smoking status: Not on file    Smokeless tobacco: Not on file   Substance and Sexual Activity    Alcohol use: Not on file    Drug use: Not on file    Sexual activity: Not on file   Other Topics Concern    Not on file   Social History Narrative    Not on file     Social Determinants of Health     Financial Resource Strain: Not on file   Food Insecurity: Not on file   Transportation Needs: Not on file   Physical Activity: Not on file   Stress: Not on file   Social Connections: Not on file   Intimate Partner Violence: Not on file   Housing Stability: Not on file       Vital signs: 138/66, 97.5, 68, 18, 97%    Objective   Physical Exam  HENT:      Head: Normocephalic and atraumatic.      Nose: Nose normal.      Mouth/Throat:      Mouth: Mucous membranes are moist.      Pharynx: Oropharynx is clear.   Eyes:      Extraocular Movements: Extraocular movements intact.      Pupils: Pupils are equal, round, and reactive to light.   Cardiovascular:       Rate and Rhythm: Normal rate and regular rhythm.   Pulmonary:      Effort: No respiratory distress.      Breath sounds: Normal breath sounds. No wheezing, rhonchi or rales.   Abdominal:      General: Bowel sounds are normal. There is no distension.      Palpations: Abdomen is soft.      Tenderness: There is no abdominal tenderness. There is no guarding.   Musculoskeletal:      Right lower leg: No edema.      Left lower leg: No edema.   Skin:     General: Skin is warm and dry.   Neurological:      Mental Status: She is alert. Mental status is at baseline.         Assessment/Plan   Problem List Items Addressed This Visit       Primary hypertension     Continue current medications         Mixed hyperlipidemia     Continue current medications         Other specified hypothyroidism     Continue current medications         Rheumatoid arthritis involving multiple sites (CMS/[facility])     Continue current medications         Recurrent major depressive disorder, in remission (CMS/[facility])     Continue current medications         Anxiety     Continue current medications         Stage 3b chronic kidney disease (CMS/[facility]) - Primary     Medications, treatments, and labs reviewed  Continue medications and treatments as listed in [facility]    Scribe Attestation  Maria De Jesus ALY Scribe   attest that this documentation has been prepared under the direction and in the presence of Obinna Hopkins DO.    Provider Attestation - Scribe documentation  All medical record entries made by the Scribe were at my direction and personally dictated by me. I have reviewed the chart and agree that the record accurately reflects my personal performance of the history, physical exam, discussion and plan.    Obinna Hopkins DO

## 2023-11-28 PROBLEM — N18.32 STAGE 3B CHRONIC KIDNEY DISEASE (MULTI): Status: ACTIVE | Noted: 2023-11-28

## 2023-12-06 ENCOUNTER — NURSING HOME VISIT (OUTPATIENT)
Dept: POST ACUTE CARE | Facility: EXTERNAL LOCATION | Age: 83
End: 2023-12-06
Payer: COMMERCIAL

## 2023-12-06 DIAGNOSIS — H65.92 LEFT NON-SUPPURATIVE OTITIS MEDIA: Primary | ICD-10-CM

## 2023-12-06 PROCEDURE — 99309 SBSQ NF CARE MODERATE MDM 30: CPT | Performed by: REGISTERED NURSE

## 2023-12-06 NOTE — LETTER
Patient: Brooke Lam  : 1940    Encounter Date: 2023    PROGRESS NOTE    Subjective  Chief complaint: Brooke Lam is a 83 y.o. female who is a long term care patient being seen and evaluated for left ear pain.    HPI:  HPI patient complaining of left ear pain started approximately 3 days ago  Objective  Vital signs: 164/79, 98, 83, 16, 97%    Physical Exam  Constitutional:       General: She is not in acute distress.  Eyes:      Extraocular Movements: Extraocular movements intact.   Pulmonary:      Effort: Pulmonary effort is normal.   Musculoskeletal:      Cervical back: Neck supple.   Neurological:      Mental Status: She is alert.   Psychiatric:         Mood and Affect: Mood normal.         Behavior: Behavior is cooperative.         Assessment/Plan  Problem List Items Addressed This Visit       Left non-suppurative otitis media - Primary     Amoxicillin          Medications, treatments, and labs reviewed  Continue medications and treatments as listed in [facility]    Scribe Attestation  Maria De Jesus ALY Scribe   attest that this documentation has been prepared under the direction and in the presence of REMA Sesay.    Provider Attestation - Scribe documentation  All medical record entries made by the Scribe were at my direction and personally dictated by me. I have reviewed the chart and agree that the record accurately reflects my personal performance of the history, physical exam, discussion and plan.    REMA Sesay          Electronically Signed By: REMA Sesay   23 11:22 AM

## 2023-12-08 ENCOUNTER — LAB REQUISITION (OUTPATIENT)
Dept: LAB | Facility: HOSPITAL | Age: 83
End: 2023-12-08
Payer: COMMERCIAL

## 2023-12-08 DIAGNOSIS — R41.82 ALTERED MENTAL STATUS, UNSPECIFIED: ICD-10-CM

## 2023-12-08 LAB
ANION GAP SERPL CALC-SCNC: 13 MMOL/L (ref 10–20)
BUN SERPL-MCNC: 22 MG/DL (ref 6–23)
CALCIUM SERPL-MCNC: 9.3 MG/DL (ref 8.6–10.3)
CHLORIDE SERPL-SCNC: 104 MMOL/L (ref 98–107)
CO2 SERPL-SCNC: 29 MMOL/L (ref 21–32)
CREAT SERPL-MCNC: 0.84 MG/DL (ref 0.5–1.05)
ERYTHROCYTE [DISTWIDTH] IN BLOOD BY AUTOMATED COUNT: 14.7 % (ref 11.5–14.5)
GFR SERPL CREATININE-BSD FRML MDRD: 69 ML/MIN/1.73M*2
GLUCOSE SERPL-MCNC: 84 MG/DL (ref 74–99)
HCT VFR BLD AUTO: 37.3 % (ref 36–46)
HGB BLD-MCNC: 12.2 G/DL (ref 12–16)
MCH RBC QN AUTO: 30.3 PG (ref 26–34)
MCHC RBC AUTO-ENTMCNC: 32.7 G/DL (ref 32–36)
MCV RBC AUTO: 93 FL (ref 80–100)
NRBC BLD-RTO: 0 /100 WBCS (ref 0–0)
PLATELET # BLD AUTO: 186 X10*3/UL (ref 150–450)
POTASSIUM SERPL-SCNC: 4.5 MMOL/L (ref 3.5–5.3)
RBC # BLD AUTO: 4.02 X10*6/UL (ref 4–5.2)
SODIUM SERPL-SCNC: 141 MMOL/L (ref 136–145)
WBC # BLD AUTO: 3.6 X10*3/UL (ref 4.4–11.3)

## 2023-12-08 PROCEDURE — 85027 COMPLETE CBC AUTOMATED: CPT

## 2023-12-08 PROCEDURE — 80048 BASIC METABOLIC PNL TOTAL CA: CPT

## 2023-12-18 ENCOUNTER — NURSING HOME VISIT (OUTPATIENT)
Dept: POST ACUTE CARE | Facility: EXTERNAL LOCATION | Age: 83
End: 2023-12-18
Payer: COMMERCIAL

## 2023-12-18 DIAGNOSIS — F41.9 ANXIETY: Primary | ICD-10-CM

## 2023-12-18 PROCEDURE — 99308 SBSQ NF CARE LOW MDM 20: CPT | Performed by: REGISTERED NURSE

## 2023-12-18 NOTE — LETTER
Patient: Brooke Lam  : 1940    Encounter Date: 2023    PROGRESS NOTE    Subjective  Chief complaint: Brooke Lam is a 83 y.o. female who is a long term care patient being seen and evaluated for follow up.     HPI:  HPI nurse stating patient having increased anxiety and agitation  Objective  Vital signs: 164/79, 98, 83, 97%    Physical Exam  Constitutional:       General: She is not in acute distress.  Eyes:      Extraocular Movements: Extraocular movements intact.   Pulmonary:      Effort: Pulmonary effort is normal.   Musculoskeletal:      Cervical back: Neck supple.   Neurological:      Mental Status: She is alert.   Psychiatric:         Mood and Affect: Mood normal.         Behavior: Behavior is cooperative.         Assessment/Plan  Problem List Items Addressed This Visit       Anxiety - Primary     ativan          Medications, treatments, and labs reviewed  Continue medications and treatments as listed in [facility]    Scribe Attestation  IMaria De Jesus Scribe   attest that this documentation has been prepared under the direction and in the presence of REMA Sesay.    Provider Attestation - Scribe documentation  All medical record entries made by the Scribe were at my direction and personally dictated by me. I have reviewed the chart and agree that the record accurately reflects my personal performance of the history, physical exam, discussion and plan.    REMA Sesay          Electronically Signed By: REMA Sesay   23  3:14 PM

## 2023-12-19 NOTE — PROGRESS NOTES
PROGRESS NOTE    Subjective   Chief complaint: Brooke Lam is a 83 y.o. female who is a long term care patient being seen and evaluated for left ear pain.    HPI:  HPI patient complaining of left ear pain started approximately 3 days ago  Objective   Vital signs: 164/79, 98, 83, 16, 97%    Physical Exam  Constitutional:       General: She is not in acute distress.  Eyes:      Extraocular Movements: Extraocular movements intact.   Pulmonary:      Effort: Pulmonary effort is normal.   Musculoskeletal:      Cervical back: Neck supple.   Neurological:      Mental Status: She is alert.   Psychiatric:         Mood and Affect: Mood normal.         Behavior: Behavior is cooperative.         Assessment/Plan   Problem List Items Addressed This Visit       Left non-suppurative otitis media - Primary     Amoxicillin          Medications, treatments, and labs reviewed  Continue medications and treatments as listed in [facility]    Scribe Attestation  IMaria De Jesus Scribe   attest that this documentation has been prepared under the direction and in the presence of REMA Sesay.    Provider Attestation - Scribe documentation  All medical record entries made by the Scribe were at my direction and personally dictated by me. I have reviewed the chart and agree that the record accurately reflects my personal performance of the history, physical exam, discussion and plan.    REMA Sesay

## 2023-12-27 ENCOUNTER — NURSING HOME VISIT (OUTPATIENT)
Dept: POST ACUTE CARE | Facility: EXTERNAL LOCATION | Age: 83
End: 2023-12-27
Payer: COMMERCIAL

## 2023-12-27 DIAGNOSIS — F33.40 RECURRENT MAJOR DEPRESSIVE DISORDER, IN REMISSION (CMS-HCC): ICD-10-CM

## 2023-12-27 DIAGNOSIS — I10 PRIMARY HYPERTENSION: ICD-10-CM

## 2023-12-27 DIAGNOSIS — F41.9 ANXIETY: Primary | ICD-10-CM

## 2023-12-27 DIAGNOSIS — E03.8 OTHER SPECIFIED HYPOTHYROIDISM: ICD-10-CM

## 2023-12-27 PROCEDURE — 99309 SBSQ NF CARE MODERATE MDM 30: CPT | Performed by: REGISTERED NURSE

## 2023-12-27 NOTE — PROGRESS NOTES
PROGRESS NOTE    Subjective   Chief complaint: Brooke Lam is a 83 y.o. female who is a long term care patient being seen and evaluated for follow up.     HPI:  HPI nurse stating patient having increased anxiety and agitation  Objective   Vital signs: 164/79, 98, 83, 97%    Physical Exam  Constitutional:       General: She is not in acute distress.  Eyes:      Extraocular Movements: Extraocular movements intact.   Pulmonary:      Effort: Pulmonary effort is normal.   Musculoskeletal:      Cervical back: Neck supple.   Neurological:      Mental Status: She is alert.   Psychiatric:         Mood and Affect: Mood normal.         Behavior: Behavior is cooperative.         Assessment/Plan   Problem List Items Addressed This Visit       Anxiety - Primary     ativan          Medications, treatments, and labs reviewed  Continue medications and treatments as listed in [facility]    Scribe Attestation  IMaria De Jesus Scribe   attest that this documentation has been prepared under the direction and in the presence of REMA Sesay.    Provider Attestation - Scribe documentation  All medical record entries made by the Scribe were at my direction and personally dictated by me. I have reviewed the chart and agree that the record accurately reflects my personal performance of the history, physical exam, discussion and plan.    REMA Sesay

## 2023-12-27 NOTE — LETTER
Patient: Brooke Lam  : 1940    Encounter Date: 2023    PROGRESS NOTE    Subjective  Chief complaint: Brooke Lam is a 83 y.o. female patient being seen and evaluated for monthly general medical care and follow-up    HPI:  23 Patient presents for general medical care and f/u.  Patient seen and examined at bedside.  No issues per nursing.  Patient has no acute complaints.          Objective  Vital signs: 164/79, 98, 83, 97%    Physical Exam  Constitutional:       General: She is not in acute distress.  Eyes:      Extraocular Movements: Extraocular movements intact.   Pulmonary:      Effort: Pulmonary effort is normal.   Musculoskeletal:      Cervical back: Neck supple.   Neurological:      Mental Status: She is alert.   Psychiatric:         Mood and Affect: Mood normal.         Behavior: Behavior is cooperative.         Assessment/Plan  Problem List Items Addressed This Visit       Primary hypertension      Stable   BP within range   monitor         Other specified hypothyroidism      Stable   no disruptions in sleep patterns or appetite   no diarrhea/constipation, hot/cold intolerance   Synthroid   monitor TSH         Recurrent major depressive disorder, in remission (CMS/[facility])      Stable   no SI HI or self isolation   engages with others   Zoloft   monitor         Anxiety - Primary     ativan          Medications, treatments, and labs reviewed  Continue medications and treatments as listed in EMR    Scribe Attestation  IMaria De Jesus Scribe   attest that this documentation has been prepared under the direction and in the presence of REMA Sesay.    Provider Attestation - Scribe documentation  All medical record entries made by the Scribe were at my direction and personally dictated by me. I have reviewed the chart and agree that the record accurately reflects my personal performance of the history, physical exam, discussion and plan.    Obinna Gonzales  PAULA-CNP      Electronically Signed By: REMA Sesay   1/18/24  3:28 PM

## 2023-12-28 PROBLEM — H65.92 LEFT NON-SUPPURATIVE OTITIS MEDIA: Status: ACTIVE | Noted: 2023-12-28

## 2024-01-04 NOTE — PROGRESS NOTES
PROGRESS NOTE    Subjective   Chief complaint: Brooke Lam is a 83 y.o. female patient being seen and evaluated for monthly general medical care and follow-up    HPI:  12/27/23 Patient presents for general medical care and f/u.  Patient seen and examined at bedside.  No issues per nursing.  Patient has no acute complaints.          Objective   Vital signs: 164/79, 98, 83, 97%    Physical Exam  Constitutional:       General: She is not in acute distress.  Eyes:      Extraocular Movements: Extraocular movements intact.   Pulmonary:      Effort: Pulmonary effort is normal.   Musculoskeletal:      Cervical back: Neck supple.   Neurological:      Mental Status: She is alert.   Psychiatric:         Mood and Affect: Mood normal.         Behavior: Behavior is cooperative.         Assessment/Plan   Problem List Items Addressed This Visit       Primary hypertension      Stable   BP within range   monitor         Other specified hypothyroidism      Stable   no disruptions in sleep patterns or appetite   no diarrhea/constipation, hot/cold intolerance   Synthroid   monitor TSH         Recurrent major depressive disorder, in remission (CMS/HCC)      Stable   no SI HI or self isolation   engages with others   Zoloft   monitor         Anxiety - Primary     ativan          Medications, treatments, and labs reviewed  Continue medications and treatments as listed in EMR    Scribe Attestation  I, Solomon Holguin   attest that this documentation has been prepared under the direction and in the presence of REMA Sesay.    Provider Attestation - Scribe documentation  All medical record entries made by the Scribe were at my direction and personally dictated by me. I have reviewed the chart and agree that the record accurately reflects my personal performance of the history, physical exam, discussion and plan.    REMA Sesay

## 2024-01-29 ENCOUNTER — NURSING HOME VISIT (OUTPATIENT)
Dept: POST ACUTE CARE | Facility: EXTERNAL LOCATION | Age: 84
End: 2024-01-29
Payer: COMMERCIAL

## 2024-01-29 DIAGNOSIS — F41.9 ANXIETY: ICD-10-CM

## 2024-01-29 DIAGNOSIS — F33.40 RECURRENT MAJOR DEPRESSIVE DISORDER, IN REMISSION (CMS-HCC): ICD-10-CM

## 2024-01-29 DIAGNOSIS — I10 PRIMARY HYPERTENSION: Primary | ICD-10-CM

## 2024-01-29 PROCEDURE — 99309 SBSQ NF CARE MODERATE MDM 30: CPT | Performed by: REGISTERED NURSE

## 2024-01-29 NOTE — LETTER
Patient: Brooke Lam  : 1940    Encounter Date: 2024    PROGRESS NOTE    Subjective  Chief complaint: Brooke Lam is a 83 y.o. female patient being seen and evaluated for monthly general medical care and follow-up    HPI:  24 Patient presents for general medical care and f/u.  Patient seen and examined at bedside.  No issues per nursing.  Patient has no acute complaints.          Objective  Vital signs: 125/59, 97.8, 85, 18, 102.0, 97%    Physical Exam  Constitutional:       General: She is not in acute distress.  Eyes:      Extraocular Movements: Extraocular movements intact.   Pulmonary:      Effort: Pulmonary effort is normal.   Musculoskeletal:      Cervical back: Neck supple.   Neurological:      Mental Status: She is alert.   Psychiatric:         Mood and Affect: Mood normal.         Behavior: Behavior is cooperative.         Assessment/Plan  Problem List Items Addressed This Visit       Primary hypertension - Primary      Stable   BP within range   monitor         Recurrent major depressive disorder, in remission (CMS/HCC)      Stable   no SI HI or self isolation   engages with others   Zoloft   monitor         Anxiety     ativan          Medications, treatments, and labs reviewed  Continue medications and treatments as listed in EMR    Scribe Attestation  I, Solomon Holguin   attest that this documentation has been prepared under the direction and in the presence of REMA Sesay.    Provider Attestation - Scribe documentation  All medical record entries made by the Scribe were at my direction and personally dictated by me. I have reviewed the chart and agree that the record accurately reflects my personal performance of the history, physical exam, discussion and plan.    REMA Sesay      Electronically Signed By: REMA Sesay   24 10:15 AM

## 2024-01-31 NOTE — PROGRESS NOTES
PROGRESS NOTE    Subjective   Chief complaint: Brooke Lam is a 83 y.o. female patient being seen and evaluated for monthly general medical care and follow-up    HPI:  1/29/24 Patient presents for general medical care and f/u.  Patient seen and examined at bedside.  No issues per nursing.  Patient has no acute complaints.          Objective   Vital signs: 125/59, 97.8, 85, 18, 102.0, 97%    Physical Exam  Constitutional:       General: She is not in acute distress.  Eyes:      Extraocular Movements: Extraocular movements intact.   Pulmonary:      Effort: Pulmonary effort is normal.   Musculoskeletal:      Cervical back: Neck supple.   Neurological:      Mental Status: She is alert.   Psychiatric:         Mood and Affect: Mood normal.         Behavior: Behavior is cooperative.         Assessment/Plan   Problem List Items Addressed This Visit       Primary hypertension - Primary      Stable   BP within range   monitor         Recurrent major depressive disorder, in remission (CMS/HCC)      Stable   no SI HI or self isolation   engages with others   Zoloft   monitor         Anxiety     ativan          Medications, treatments, and labs reviewed  Continue medications and treatments as listed in EMR    Scribe Attestation  I, Solomon Holguin   attest that this documentation has been prepared under the direction and in the presence of REMA Sesay.    Provider Attestation - Scribe documentation  All medical record entries made by the Scribe were at my direction and personally dictated by me. I have reviewed the chart and agree that the record accurately reflects my personal performance of the history, physical exam, discussion and plan.    REMA Sesay

## 2024-02-26 ENCOUNTER — NURSING HOME VISIT (OUTPATIENT)
Dept: POST ACUTE CARE | Facility: EXTERNAL LOCATION | Age: 84
End: 2024-02-26
Payer: COMMERCIAL

## 2024-02-26 DIAGNOSIS — F41.9 ANXIETY: ICD-10-CM

## 2024-02-26 DIAGNOSIS — F33.40 RECURRENT MAJOR DEPRESSIVE DISORDER, IN REMISSION (CMS-HCC): Primary | ICD-10-CM

## 2024-02-26 DIAGNOSIS — I10 PRIMARY HYPERTENSION: ICD-10-CM

## 2024-02-26 PROCEDURE — 99309 SBSQ NF CARE MODERATE MDM 30: CPT | Performed by: REGISTERED NURSE

## 2024-02-26 NOTE — LETTER
Patient: Brooke Lam  : 1940    Encounter Date: 2024    PROGRESS NOTE    Subjective  Chief complaint: Brooke Lam is a 83 y.o. female patient being seen and evaluated for monthly general medical care and follow-up    HPI:  24 Patient presents for general medical care and f/u.  Patient seen and examined at bedside.  No issues per nursing.  Patient has no acute complaints.          Objective  Vital signs: 134/78, 97.6, 77, 18, 102.0, 96%    Physical Exam  Constitutional:       General: She is not in acute distress.  Eyes:      Extraocular Movements: Extraocular movements intact.   Pulmonary:      Effort: Pulmonary effort is normal.   Musculoskeletal:      Cervical back: Neck supple.   Neurological:      Mental Status: She is alert.   Psychiatric:         Mood and Affect: Mood normal.         Behavior: Behavior is cooperative.         Assessment/Plan  Problem List Items Addressed This Visit       Primary hypertension      Stable   BP within range   monitor         Recurrent major depressive disorder, in remission (CMS/HCC) - Primary      Stable   no SI HI or self isolation   engages with others   Zoloft   monitor         Anxiety     ativan          Medications, treatments, and labs reviewed  Continue medications and treatments as listed in EMR    Scribe Attestation  I, Solomon Holguin   attest that this documentation has been prepared under the direction and in the presence of REMA Sesay.    Provider Attestation - Scribe documentation  All medical record entries made by the Scribe were at my direction and personally dictated by me. I have reviewed the chart and agree that the record accurately reflects my personal performance of the history, physical exam, discussion and plan.    REMA Sesay      Electronically Signed By: REMA Sesay   3/14/24 12:50 PM

## 2024-02-28 NOTE — PROGRESS NOTES
PROGRESS NOTE    Subjective   Chief complaint: Brooke Lam is a 83 y.o. female patient being seen and evaluated for monthly general medical care and follow-up    HPI:  2/26/24 Patient presents for general medical care and f/u.  Patient seen and examined at bedside.  No issues per nursing.  Patient has no acute complaints.          Objective   Vital signs: 134/78, 97.6, 77, 18, 102.0, 96%    Physical Exam  Constitutional:       General: She is not in acute distress.  Eyes:      Extraocular Movements: Extraocular movements intact.   Pulmonary:      Effort: Pulmonary effort is normal.   Musculoskeletal:      Cervical back: Neck supple.   Neurological:      Mental Status: She is alert.   Psychiatric:         Mood and Affect: Mood normal.         Behavior: Behavior is cooperative.         Assessment/Plan   Problem List Items Addressed This Visit       Primary hypertension      Stable   BP within range   monitor         Recurrent major depressive disorder, in remission (CMS/HCC) - Primary      Stable   no SI HI or self isolation   engages with others   Zoloft   monitor         Anxiety     ativan          Medications, treatments, and labs reviewed  Continue medications and treatments as listed in EMR    Scribe Attestation  I, Solomon Holguin   attest that this documentation has been prepared under the direction and in the presence of REMA Sesay.    Provider Attestation - Scribe documentation  All medical record entries made by the Scribe were at my direction and personally dictated by me. I have reviewed the chart and agree that the record accurately reflects my personal performance of the history, physical exam, discussion and plan.    REMA Sesay

## 2024-03-25 ENCOUNTER — NURSING HOME VISIT (OUTPATIENT)
Dept: POST ACUTE CARE | Facility: EXTERNAL LOCATION | Age: 84
End: 2024-03-25
Payer: COMMERCIAL

## 2024-03-25 DIAGNOSIS — F33.40 RECURRENT MAJOR DEPRESSIVE DISORDER, IN REMISSION (CMS-HCC): ICD-10-CM

## 2024-03-25 DIAGNOSIS — F41.9 ANXIETY: Primary | ICD-10-CM

## 2024-03-25 DIAGNOSIS — I10 PRIMARY HYPERTENSION: ICD-10-CM

## 2024-03-25 PROCEDURE — 99309 SBSQ NF CARE MODERATE MDM 30: CPT | Performed by: REGISTERED NURSE

## 2024-03-25 NOTE — LETTER
Patient: Brooke Lam  : 1940    Encounter Date: 2024    PROGRESS NOTE    Subjective  Chief complaint: Brooke Lam is a 84 y.o. female patient being seen and evaluated for monthly general medical care and follow-up    HPI:  3/25/24 Patient presents for general medical care and f/u.  Patient seen and examined at bedside.  No issues per nursing.  Patient has no acute complaints.          Objective  Vital signs: 124/78, 97.6, 77, 18, 102.0, 97%    Physical Exam  Constitutional:       General: She is not in acute distress.  Eyes:      Extraocular Movements: Extraocular movements intact.   Pulmonary:      Effort: Pulmonary effort is normal.   Musculoskeletal:      Cervical back: Neck supple.   Neurological:      Mental Status: She is alert.   Psychiatric:         Mood and Affect: Mood normal.         Behavior: Behavior is cooperative.         Assessment/Plan  Problem List Items Addressed This Visit       Primary hypertension     Stable          Recurrent major depressive disorder, in remission (CMS-HCC)      Stable   no SI HI or self isolation   engages with others   Zoloft   monitor         Anxiety - Primary     ativan          Medications, treatments, and labs reviewed  Continue medications and treatments as listed in EMR    Scribe Attestation  I, Solomon Holguin   attest that this documentation has been prepared under the direction and in the presence of REMA Sesay.    Provider Attestation - Scribe documentation  All medical record entries made by the Scribe were at my direction and personally dictated by me. I have reviewed the chart and agree that the record accurately reflects my personal performance of the history, physical exam, discussion and plan.    REMA Sesay      Electronically Signed By: REMA Sesay   24 12:10 PM

## 2024-03-26 NOTE — PROGRESS NOTES
PROGRESS NOTE    Subjective   Chief complaint: Brooke Lam is a 84 y.o. female patient being seen and evaluated for monthly general medical care and follow-up    HPI:  3/25/24 Patient presents for general medical care and f/u.  Patient seen and examined at bedside.  No issues per nursing.  Patient has no acute complaints.          Objective   Vital signs: 124/78, 97.6, 77, 18, 102.0, 97%    Physical Exam  Constitutional:       General: She is not in acute distress.  Eyes:      Extraocular Movements: Extraocular movements intact.   Pulmonary:      Effort: Pulmonary effort is normal.   Musculoskeletal:      Cervical back: Neck supple.   Neurological:      Mental Status: She is alert.   Psychiatric:         Mood and Affect: Mood normal.         Behavior: Behavior is cooperative.         Assessment/Plan   Problem List Items Addressed This Visit       Primary hypertension     Stable          Recurrent major depressive disorder, in remission (CMS-HCC)      Stable   no SI HI or self isolation   engages with others   Zoloft   monitor         Anxiety - Primary     ativan          Medications, treatments, and labs reviewed  Continue medications and treatments as listed in EMR    Scribe Attestation  I, Solomon Holguin   attest that this documentation has been prepared under the direction and in the presence of REMA Sesay.    Provider Attestation - Scribe documentation  All medical record entries made by the Scribe were at my direction and personally dictated by me. I have reviewed the chart and agree that the record accurately reflects my personal performance of the history, physical exam, discussion and plan.    REMA Sesay

## 2024-04-29 ENCOUNTER — NURSING HOME VISIT (OUTPATIENT)
Dept: POST ACUTE CARE | Facility: EXTERNAL LOCATION | Age: 84
End: 2024-04-29
Payer: COMMERCIAL

## 2024-04-29 DIAGNOSIS — F33.40 RECURRENT MAJOR DEPRESSIVE DISORDER, IN REMISSION (CMS-HCC): Primary | ICD-10-CM

## 2024-04-29 DIAGNOSIS — M06.9 RHEUMATOID ARTHRITIS INVOLVING MULTIPLE SITES, UNSPECIFIED WHETHER RHEUMATOID FACTOR PRESENT (MULTI): ICD-10-CM

## 2024-04-29 DIAGNOSIS — I10 PRIMARY HYPERTENSION: ICD-10-CM

## 2024-04-29 DIAGNOSIS — N18.32 STAGE 3B CHRONIC KIDNEY DISEASE (MULTI): ICD-10-CM

## 2024-04-29 PROCEDURE — 99309 SBSQ NF CARE MODERATE MDM 30: CPT | Performed by: REGISTERED NURSE

## 2024-04-29 NOTE — LETTER
Patient: Brooke Lam  : 1940    Encounter Date: 2024    PROGRESS NOTE    Subjective  Chief complaint: Brooke Lam is a 84 y.o. female patient being seen and evaluated for monthly general medical care and follow-up    HPI:  24 Patient presents for general medical care and f/u.  Patient seen and examined at bedside.  No issues per nursing.  Patient has no acute complaints.        Objective  Vital signs: 132/74, 98.2, 80, 18, 102.0, 96%    Physical Exam  Constitutional:       General: She is not in acute distress.  Eyes:      Extraocular Movements: Extraocular movements intact.   Pulmonary:      Effort: Pulmonary effort is normal.   Musculoskeletal:      Cervical back: Neck supple.   Neurological:      Mental Status: She is alert.   Psychiatric:         Mood and Affect: Mood normal.         Behavior: Behavior is cooperative.         Assessment/Plan  Problem List Items Addressed This Visit       Primary hypertension     Stable          Rheumatoid arthritis involving multiple sites (Multi)      Stable   pain managed with Tylenol Mobic   Megestrol   monitor            Recurrent major depressive disorder, in remission (CMS-[facility]) - Primary      Stable   no SI HI or self isolation   engages with others   Zoloft   monitor         Stage 3b chronic kidney disease (Multi)     Monitor renal fxn          Medications, treatments, and labs reviewed  Continue medications and treatments as listed in EMR    Scribe Attestation  I, Solomon Holguin   attest that this documentation has been prepared under the direction and in the presence of REMA Sesay.    Provider Attestation - Scribe documentation  All medical record entries made by the Scribe were at my direction and personally dictated by me. I have reviewed the chart and agree that the record accurately reflects my personal performance of the history, physical exam, discussion and plan.    Obinna Gonzales  APRN-CNP      Electronically Signed By: REMA Sesay   5/16/24 10:31 AM

## 2024-04-30 NOTE — PROGRESS NOTES
PROGRESS NOTE    Subjective   Chief complaint: Brooke Lam is a 84 y.o. female patient being seen and evaluated for monthly general medical care and follow-up    HPI:  4/29/24 Patient presents for general medical care and f/u.  Patient seen and examined at bedside.  No issues per nursing.  Patient has no acute complaints.        Objective   Vital signs: 132/74, 98.2, 80, 18, 102.0, 96%    Physical Exam  Constitutional:       General: She is not in acute distress.  Eyes:      Extraocular Movements: Extraocular movements intact.   Pulmonary:      Effort: Pulmonary effort is normal.   Musculoskeletal:      Cervical back: Neck supple.   Neurological:      Mental Status: She is alert.   Psychiatric:         Mood and Affect: Mood normal.         Behavior: Behavior is cooperative.         Assessment/Plan   Problem List Items Addressed This Visit       Primary hypertension     Stable          Rheumatoid arthritis involving multiple sites (Multi)      Stable   pain managed with Tylenol Mobic   Megestrol   monitor            Recurrent major depressive disorder, in remission (CMS-[facility]) - Primary      Stable   no SI HI or self isolation   engages with others   Zoloft   monitor         Stage 3b chronic kidney disease (Multi)     Monitor renal fxn          Medications, treatments, and labs reviewed  Continue medications and treatments as listed in EMR    Scribe Attestation  I, Solomon Holguin   attest that this documentation has been prepared under the direction and in the presence of REMA Sesay.    Provider Attestation - Scribe documentation  All medical record entries made by the Scribe were at my direction and personally dictated by me. I have reviewed the chart and agree that the record accurately reflects my personal performance of the history, physical exam, discussion and plan.    REMA Sesay

## 2024-05-20 ENCOUNTER — NURSING HOME VISIT (OUTPATIENT)
Dept: POST ACUTE CARE | Facility: EXTERNAL LOCATION | Age: 84
End: 2024-05-20
Payer: COMMERCIAL

## 2024-05-20 DIAGNOSIS — F33.40 RECURRENT MAJOR DEPRESSIVE DISORDER, IN REMISSION (CMS-HCC): ICD-10-CM

## 2024-05-20 DIAGNOSIS — N18.32 STAGE 3B CHRONIC KIDNEY DISEASE (MULTI): ICD-10-CM

## 2024-05-20 DIAGNOSIS — I10 PRIMARY HYPERTENSION: ICD-10-CM

## 2024-05-20 DIAGNOSIS — F41.9 ANXIETY: Primary | ICD-10-CM

## 2024-05-20 PROCEDURE — 99309 SBSQ NF CARE MODERATE MDM 30: CPT | Performed by: REGISTERED NURSE

## 2024-05-20 NOTE — LETTER
Patient: Brooke Lam  : 1940    Encounter Date: 2024    PROGRESS NOTE    Subjective  Chief complaint: Brooke Lam is a 84 y.o. female patient being seen and evaluated for monthly general medical care and follow-up    HPI:  24 Patient presents for general medical care and f/u.  Patient seen and examined at bedside.  No issues per nursing.  Patient has no acute complaints.        Objective  Vital signs: 135/65, 98.7, 62, 18, 102.0, 90%    Physical Exam  Constitutional:       General: She is not in acute distress.  Eyes:      Extraocular Movements: Extraocular movements intact.   Pulmonary:      Effort: Pulmonary effort is normal.   Musculoskeletal:      Cervical back: Neck supple.   Neurological:      Mental Status: She is alert.   Psychiatric:         Mood and Affect: Mood normal.         Behavior: Behavior is cooperative.         Assessment/Plan  Problem List Items Addressed This Visit       Primary hypertension     Stable          Recurrent major depressive disorder, in remission (CMS-HCC)      Stable   no SI HI or self isolation   engages with others   Zoloft   monitor         Anxiety - Primary     ativan         Stage 3b chronic kidney disease (Multi)     Monitor renal fxn          Medications, treatments, and labs reviewed  Continue medications and treatments as listed in EMR    Scribe Attestation  IMaria De Jesus Scribe   attest that this documentation has been prepared under the direction and in the presence of REMA Sesay.    Provider Attestation - Scribe documentation  All medical record entries made by the Scribe were at my direction and personally dictated by me. I have reviewed the chart and agree that the record accurately reflects my personal performance of the history, physical exam, discussion and plan.    REMA Sesay      Electronically Signed By: REMA Sseay   24  8:09 PM

## 2024-05-21 NOTE — PROGRESS NOTES
PROGRESS NOTE    Subjective   Chief complaint: Brooke Lam is a 84 y.o. female patient being seen and evaluated for monthly general medical care and follow-up    HPI:  5/20/24 Patient presents for general medical care and f/u.  Patient seen and examined at bedside.  No issues per nursing.  Patient has no acute complaints.        Objective   Vital signs: 135/65, 98.7, 62, 18, 102.0, 90%    Physical Exam  Constitutional:       General: She is not in acute distress.  Eyes:      Extraocular Movements: Extraocular movements intact.   Pulmonary:      Effort: Pulmonary effort is normal.   Musculoskeletal:      Cervical back: Neck supple.   Neurological:      Mental Status: She is alert.   Psychiatric:         Mood and Affect: Mood normal.         Behavior: Behavior is cooperative.         Assessment/Plan   Problem List Items Addressed This Visit       Primary hypertension     Stable          Recurrent major depressive disorder, in remission (CMS-HCC)      Stable   no SI HI or self isolation   engages with others   Zoloft   monitor         Anxiety - Primary     ativan         Stage 3b chronic kidney disease (Multi)     Monitor renal fxn          Medications, treatments, and labs reviewed  Continue medications and treatments as listed in EMR    Scribe Attestation  I, Solomon Holguin   attest that this documentation has been prepared under the direction and in the presence of REMA Sesay.    Provider Attestation - Scribe documentation  All medical record entries made by the Scribe were at my direction and personally dictated by me. I have reviewed the chart and agree that the record accurately reflects my personal performance of the history, physical exam, discussion and plan.    REMA Sesay

## 2024-06-25 ENCOUNTER — NURSING HOME VISIT (OUTPATIENT)
Dept: POST ACUTE CARE | Facility: EXTERNAL LOCATION | Age: 84
End: 2024-06-25
Payer: COMMERCIAL

## 2024-06-25 DIAGNOSIS — F41.9 ANXIETY: Primary | ICD-10-CM

## 2024-06-25 DIAGNOSIS — I10 PRIMARY HYPERTENSION: ICD-10-CM

## 2024-06-25 DIAGNOSIS — E03.8 OTHER SPECIFIED HYPOTHYROIDISM: ICD-10-CM

## 2024-06-25 DIAGNOSIS — F33.40 RECURRENT MAJOR DEPRESSIVE DISORDER, IN REMISSION (CMS-HCC): ICD-10-CM

## 2024-06-25 PROCEDURE — 99309 SBSQ NF CARE MODERATE MDM 30: CPT | Performed by: REGISTERED NURSE

## 2024-06-25 NOTE — LETTER
Patient: Brooke Lam  : 1940    Encounter Date: 2024    PROGRESS NOTE    Subjective  Chief complaint: Brooke Lam is a 84 y.o. female patient being seen and evaluated for monthly general medical care and follow-up    HPI:  Patient presents for general medical care and f/u.  Patient seen and examined at bedside.  No issues per nursing.  Patient has no acute complaints.        Objective  Vital signs: 128/62, 97.8, 78, 17, 102.0, 96%    Physical Exam  Constitutional:       General: She is not in acute distress.  Eyes:      Extraocular Movements: Extraocular movements intact.   Pulmonary:      Effort: Pulmonary effort is normal.   Musculoskeletal:      Cervical back: Neck supple.   Neurological:      Mental Status: She is alert.   Psychiatric:         Mood and Affect: Mood normal.         Behavior: Behavior is cooperative.         Assessment/Plan  Problem List Items Addressed This Visit       Primary hypertension     Stable          Other specified hypothyroidism      Stable   no disruptions in sleep patterns or appetite   no diarrhea/constipation, hot/cold intolerance   Synthroid   monitor TSH         Recurrent major depressive disorder, in remission (CMS-HCC)      Stable   no SI HI or self isolation   engages with others   Zoloft   monitor         Anxiety - Primary     ativan          Medications, treatments, and labs reviewed  Continue medications and treatments as listed in EMR    Scribe Attestation  I, Solomon Holguin   attest that this documentation has been prepared under the direction and in the presence of REMA Sesay.    Provider Attestation - Scribe documentation  All medical record entries made by the Scribe were at my direction and personally dictated by me. I have reviewed the chart and agree that the record accurately reflects my personal performance of the history, physical exam, discussion and plan.    Obinna Gonzales  APRN-CNP      Electronically Signed By: REMA Sesay   7/18/24 10:02 AM

## 2024-06-26 NOTE — PROGRESS NOTES
PROGRESS NOTE    Subjective   Chief complaint: Brooke Lam is a 84 y.o. female patient being seen and evaluated for monthly general medical care and follow-up    HPI:  Patient presents for general medical care and f/u.  Patient seen and examined at bedside.  No issues per nursing.  Patient has no acute complaints.        Objective   Vital signs: 128/62, 97.8, 78, 17, 102.0, 96%    Physical Exam  Constitutional:       General: She is not in acute distress.  Eyes:      Extraocular Movements: Extraocular movements intact.   Pulmonary:      Effort: Pulmonary effort is normal.   Musculoskeletal:      Cervical back: Neck supple.   Neurological:      Mental Status: She is alert.   Psychiatric:         Mood and Affect: Mood normal.         Behavior: Behavior is cooperative.         Assessment/Plan   Problem List Items Addressed This Visit       Primary hypertension     Stable          Other specified hypothyroidism      Stable   no disruptions in sleep patterns or appetite   no diarrhea/constipation, hot/cold intolerance   Synthroid   monitor TSH         Recurrent major depressive disorder, in remission (CMS-HCC)      Stable   no SI HI or self isolation   engages with others   Zoloft   monitor         Anxiety - Primary     ativan          Medications, treatments, and labs reviewed  Continue medications and treatments as listed in EMR    Scribe Attestation  I, Solomon Holguin   attest that this documentation has been prepared under the direction and in the presence of REAM Sesay.    Provider Attestation - Scribe documentation  All medical record entries made by the Scribe were at my direction and personally dictated by me. I have reviewed the chart and agree that the record accurately reflects my personal performance of the history, physical exam, discussion and plan.    REMA Sesay

## 2024-07-31 ENCOUNTER — NURSING HOME VISIT (OUTPATIENT)
Dept: POST ACUTE CARE | Facility: EXTERNAL LOCATION | Age: 84
End: 2024-07-31
Payer: COMMERCIAL

## 2024-07-31 DIAGNOSIS — F41.9 ANXIETY: Primary | ICD-10-CM

## 2024-07-31 DIAGNOSIS — I10 PRIMARY HYPERTENSION: ICD-10-CM

## 2024-07-31 DIAGNOSIS — M06.9 RHEUMATOID ARTHRITIS INVOLVING MULTIPLE SITES, UNSPECIFIED WHETHER RHEUMATOID FACTOR PRESENT (MULTI): ICD-10-CM

## 2024-07-31 DIAGNOSIS — F33.40 RECURRENT MAJOR DEPRESSIVE DISORDER, IN REMISSION (CMS-HCC): ICD-10-CM

## 2024-07-31 PROCEDURE — 99309 SBSQ NF CARE MODERATE MDM 30: CPT | Performed by: REGISTERED NURSE

## 2024-07-31 NOTE — LETTER
Patient: Brooke Lam  : 1940    Encounter Date: 2024    PROGRESS NOTE    Subjective  Chief complaint: Brooke Lam is a 84 y.o. female patient being seen and evaluated for monthly general medical care and follow-up    HPI:  Patient presents for general medical care and f/u.  Patient seen and examined at bedside.  No issues per nursing.  Patient has no acute complaints.        Objective  Vital signs: 140/80, 98.1, 90, 18, 102.0, 97%    Physical Exam  Constitutional:       General: She is not in acute distress.  Eyes:      Extraocular Movements: Extraocular movements intact.   Pulmonary:      Effort: Pulmonary effort is normal.   Musculoskeletal:      Cervical back: Neck supple.   Neurological:      Mental Status: She is alert.   Psychiatric:         Mood and Affect: Mood normal.         Behavior: Behavior is cooperative.         Assessment/Plan  Problem List Items Addressed This Visit       Primary hypertension     Stable          Rheumatoid arthritis involving multiple sites (Multi)      Stable   pain managed with Tylenol Mobic   Megestrol   monitor            Recurrent major depressive disorder, in remission (CMS-HCC)      Stable   no SI HI or self isolation   engages with others   Zoloft   monitor         Anxiety - Primary     ativan          Medications, treatments, and labs reviewed  Continue medications and treatments as listed in EMR    Scribe Attestation  IMaria De Jesus Scribe   attest that this documentation has been prepared under the direction and in the presence of REMA Sesay.    Provider Attestation - Scribe documentation  All medical record entries made by the Scribe were at my direction and personally dictated by me. I have reviewed the chart and agree that the record accurately reflects my personal performance of the history, physical exam, discussion and plan.    REMA Sesay      Electronically Signed By: Obinna Gonzales  PAULA-CNP   8/7/24  3:07 PM

## 2024-08-05 NOTE — PROGRESS NOTES
PROGRESS NOTE    Subjective   Chief complaint: Brooke Lam is a 84 y.o. female patient being seen and evaluated for monthly general medical care and follow-up    HPI:  Patient presents for general medical care and f/u.  Patient seen and examined at bedside.  No issues per nursing.  Patient has no acute complaints.        Objective   Vital signs: 140/80, 98.1, 90, 18, 102.0, 97%    Physical Exam  Constitutional:       General: She is not in acute distress.  Eyes:      Extraocular Movements: Extraocular movements intact.   Pulmonary:      Effort: Pulmonary effort is normal.   Musculoskeletal:      Cervical back: Neck supple.   Neurological:      Mental Status: She is alert.   Psychiatric:         Mood and Affect: Mood normal.         Behavior: Behavior is cooperative.         Assessment/Plan   Problem List Items Addressed This Visit       Primary hypertension     Stable          Rheumatoid arthritis involving multiple sites (Multi)      Stable   pain managed with Tylenol Mobic   Megestrol   monitor            Recurrent major depressive disorder, in remission (CMS-HCC)      Stable   no SI HI or self isolation   engages with others   Zoloft   monitor         Anxiety - Primary     ativan          Medications, treatments, and labs reviewed  Continue medications and treatments as listed in EMR    Scribe Attestation  IMaria De Jesus Scribe   attest that this documentation has been prepared under the direction and in the presence of REMA Sesay.    Provider Attestation - Scribe documentation  All medical record entries made by the Scribe were at my direction and personally dictated by me. I have reviewed the chart and agree that the record accurately reflects my personal performance of the history, physical exam, discussion and plan.    REMA Sesay

## 2024-08-28 ENCOUNTER — NURSING HOME VISIT (OUTPATIENT)
Dept: POST ACUTE CARE | Facility: EXTERNAL LOCATION | Age: 84
End: 2024-08-28
Payer: COMMERCIAL

## 2024-08-28 DIAGNOSIS — E78.2 MIXED HYPERLIPIDEMIA: ICD-10-CM

## 2024-08-28 DIAGNOSIS — I10 PRIMARY HYPERTENSION: Primary | ICD-10-CM

## 2024-08-28 DIAGNOSIS — F41.9 ANXIETY: ICD-10-CM

## 2024-08-28 DIAGNOSIS — N18.32 STAGE 3B CHRONIC KIDNEY DISEASE (MULTI): ICD-10-CM

## 2024-08-28 PROCEDURE — 99309 SBSQ NF CARE MODERATE MDM 30: CPT | Performed by: REGISTERED NURSE

## 2024-08-28 NOTE — LETTER
Patient: Brooke Lam  : 1940    Encounter Date: 2024    PROGRESS NOTE    Subjective  Chief complaint: Brooke Lam is a 84 y.o. female patient being seen and evaluated for monthly general medical care and follow-up    HPI:  Patient presents for general medical care and f/u.  Patient seen and examined at bedside.  No issues per nursing.  Patient has no acute complaints.        Objective  Vital signs: 134/72, 97.8, 76, 18, 102.0, 96%    Physical Exam  Constitutional:       General: She is not in acute distress.  Eyes:      Extraocular Movements: Extraocular movements intact.   Pulmonary:      Effort: Pulmonary effort is normal.   Musculoskeletal:      Cervical back: Neck supple.   Neurological:      Mental Status: She is alert.   Psychiatric:         Mood and Affect: Mood normal.         Behavior: Behavior is cooperative.         Assessment/Plan  Problem List Items Addressed This Visit       Primary hypertension - Primary     Stable          Mixed hyperlipidemia     Continue current medications         Anxiety     ativan         Stage 3b chronic kidney disease (Multi)     Monitor renal fxn          Medications, treatments, and labs reviewed  Continue medications and treatments as listed in EMR    Scribe Attestation  IMaria De Jesus Scribe   attest that this documentation has been prepared under the direction and in the presence of REMA Sesay.    Provider Attestation - Scribe documentation  All medical record entries made by the Scribe were at my direction and personally dictated by me. I have reviewed the chart and agree that the record accurately reflects my personal performance of the history, physical exam, discussion and plan.    REMA Sesay      Electronically Signed By: REMA Sesay   24 10:28 PM

## 2024-08-29 NOTE — PROGRESS NOTES
PROGRESS NOTE    Subjective   Chief complaint: Brooke Lam is a 84 y.o. female patient being seen and evaluated for monthly general medical care and follow-up    HPI:  Patient presents for general medical care and f/u.  Patient seen and examined at bedside.  No issues per nursing.  Patient has no acute complaints.        Objective   Vital signs: 134/72, 97.8, 76, 18, 102.0, 96%    Physical Exam  Constitutional:       General: She is not in acute distress.  Eyes:      Extraocular Movements: Extraocular movements intact.   Pulmonary:      Effort: Pulmonary effort is normal.   Musculoskeletal:      Cervical back: Neck supple.   Neurological:      Mental Status: She is alert.   Psychiatric:         Mood and Affect: Mood normal.         Behavior: Behavior is cooperative.         Assessment/Plan   Problem List Items Addressed This Visit       Primary hypertension - Primary     Stable          Mixed hyperlipidemia     Continue current medications         Anxiety     ativan         Stage 3b chronic kidney disease (Multi)     Monitor renal fxn          Medications, treatments, and labs reviewed  Continue medications and treatments as listed in EMR    Scribe Attestation  IMaria De Jesus Scribe   attest that this documentation has been prepared under the direction and in the presence of REMA Sesay.    Provider Attestation - Scribe documentation  All medical record entries made by the Scribe were at my direction and personally dictated by me. I have reviewed the chart and agree that the record accurately reflects my personal performance of the history, physical exam, discussion and plan.    REMA Sesay

## 2024-09-03 ENCOUNTER — NURSING HOME VISIT (OUTPATIENT)
Dept: POST ACUTE CARE | Facility: EXTERNAL LOCATION | Age: 84
End: 2024-09-03
Payer: COMMERCIAL

## 2024-09-03 DIAGNOSIS — N18.32 STAGE 3B CHRONIC KIDNEY DISEASE (MULTI): Primary | ICD-10-CM

## 2024-09-03 DIAGNOSIS — I10 PRIMARY HYPERTENSION: ICD-10-CM

## 2024-09-03 DIAGNOSIS — G47.00 INSOMNIA, UNSPECIFIED TYPE: ICD-10-CM

## 2024-09-03 DIAGNOSIS — F41.9 ANXIETY: ICD-10-CM

## 2024-09-03 PROCEDURE — 99308 SBSQ NF CARE LOW MDM 20: CPT | Performed by: REGISTERED NURSE

## 2024-09-03 NOTE — LETTER
Patient: Brooke Lam  : 1940    Encounter Date: 2024    PROGRESS NOTE    Subjective  Chief complaint: Brooke Lam is a 84 y.o. female who is a long term care patient being seen and evaluated for follow up.     HPI:  HPI pt request to be seen for trouble sleeping   Objective  Vital signs: 145/79, 97.7, 57, 17, 102.0, 96%    Physical Exam  Constitutional:       General: She is not in acute distress.  Eyes:      Extraocular Movements: Extraocular movements intact.   Pulmonary:      Effort: Pulmonary effort is normal.   Musculoskeletal:      Cervical back: Neck supple.   Neurological:      Mental Status: She is alert.   Psychiatric:         Mood and Affect: Mood normal.         Behavior: Behavior is cooperative.       Assessment/Plan  Problem List Items Addressed This Visit       Primary hypertension     Stable          Anxiety     ativan         Stage 3b chronic kidney disease (Multi) - Primary     Monitor renal fxn         Insomnia     Melatonin prn           Medications, treatments, and labs reviewed  Continue medications and treatments as listed in PCC    Scribe Attestation  IMaria De Jesus Scribe   attest that this documentation has been prepared under the direction and in the presence of REMA Sesay.    Provider Attestation - Scribe documentation  All medical record entries made by the Scribe were at my direction and personally dictated by me. I have reviewed the chart and agree that the record accurately reflects my personal performance of the history, physical exam, discussion and plan.    REMA Sesay          Electronically Signed By: REMA Sesay   9/10/24  7:30 PM

## 2024-09-10 PROBLEM — G47.00 INSOMNIA: Status: ACTIVE | Noted: 2024-09-10

## 2024-09-10 NOTE — PROGRESS NOTES
PROGRESS NOTE    Subjective   Chief complaint: Brooke Lam is a 84 y.o. female who is a long term care patient being seen and evaluated for follow up.     HPI:  HPI pt request to be seen for trouble sleeping   Objective   Vital signs: 145/79, 97.7, 57, 17, 102.0, 96%    Physical Exam  Constitutional:       General: She is not in acute distress.  Eyes:      Extraocular Movements: Extraocular movements intact.   Pulmonary:      Effort: Pulmonary effort is normal.   Musculoskeletal:      Cervical back: Neck supple.   Neurological:      Mental Status: She is alert.   Psychiatric:         Mood and Affect: Mood normal.         Behavior: Behavior is cooperative.       Assessment/Plan   Problem List Items Addressed This Visit       Primary hypertension     Stable          Anxiety     ativan         Stage 3b chronic kidney disease (Multi) - Primary     Monitor renal fxn         Insomnia     Melatonin prn           Medications, treatments, and labs reviewed  Continue medications and treatments as listed in PCC    Scribe Attestation  I, Solomon Holguin   attest that this documentation has been prepared under the direction and in the presence of REMA Sesay.    Provider Attestation - Scribe documentation  All medical record entries made by the Scribe were at my direction and personally dictated by me. I have reviewed the chart and agree that the record accurately reflects my personal performance of the history, physical exam, discussion and plan.    REMA Sesay

## 2024-10-28 ENCOUNTER — NURSING HOME VISIT (OUTPATIENT)
Dept: POST ACUTE CARE | Facility: EXTERNAL LOCATION | Age: 84
End: 2024-10-28
Payer: COMMERCIAL

## 2024-10-28 DIAGNOSIS — G47.00 INSOMNIA, UNSPECIFIED TYPE: ICD-10-CM

## 2024-10-28 DIAGNOSIS — F33.40 RECURRENT MAJOR DEPRESSIVE DISORDER, IN REMISSION (CMS-HCC): ICD-10-CM

## 2024-10-28 DIAGNOSIS — I10 PRIMARY HYPERTENSION: ICD-10-CM

## 2024-10-28 DIAGNOSIS — N18.32 STAGE 3B CHRONIC KIDNEY DISEASE (MULTI): Primary | ICD-10-CM

## 2024-10-28 PROCEDURE — 99309 SBSQ NF CARE MODERATE MDM 30: CPT | Performed by: REGISTERED NURSE

## 2024-10-28 NOTE — LETTER
Patient: Brooke Lam  : 1940    Encounter Date: 10/28/2024    PROGRESS NOTE    Subjective  Chief complaint: Brooke Lam is a 84 y.o. female patient being seen and evaluated for monthly general medical care and follow-up    HPI:  Patient presents for general medical care and f/u.  Patient seen and examined at bedside.  No issues per nursing.  Patient has no acute complaints.        Objective  Vital signs: 124/76, 97.8, 71, 16, 102.0, 98%    Physical Exam  Constitutional:       General: She is not in acute distress.  Eyes:      Extraocular Movements: Extraocular movements intact.   Pulmonary:      Effort: Pulmonary effort is normal.   Musculoskeletal:      Cervical back: Neck supple.   Neurological:      Mental Status: She is alert.   Psychiatric:         Mood and Affect: Mood normal.         Behavior: Behavior is cooperative.         Assessment/Plan  Problem List Items Addressed This Visit       Primary hypertension     Stable          Recurrent major depressive disorder, in remission (CMS-HCC)      Stable   no SI HI or self isolation   engages with others   Zoloft   monitor         Stage 3b chronic kidney disease (Multi) - Primary     Monitor renal fxn         Insomnia     Melatonin prn           Medications, treatments, and labs reviewed  Continue medications and treatments as listed in EMR    Scribe Attestation  IMaria De Jesus Scribe   attest that this documentation has been prepared under the direction and in the presence of REMA Sesay.    Provider Attestation - Scribe documentation  All medical record entries made by the Scribe were at my direction and personally dictated by me. I have reviewed the chart and agree that the record accurately reflects my personal performance of the history, physical exam, discussion and plan.    REMA Sesay      Electronically Signed By: REMA Sesay   24  7:11 PM

## 2024-10-29 NOTE — PROGRESS NOTES
PROGRESS NOTE    Subjective   Chief complaint: Brooke Lam is a 84 y.o. female patient being seen and evaluated for monthly general medical care and follow-up    HPI:  Patient presents for general medical care and f/u.  Patient seen and examined at bedside.  No issues per nursing.  Patient has no acute complaints.        Objective   Vital signs: 124/76, 97.8, 71, 16, 102.0, 98%    Physical Exam  Constitutional:       General: She is not in acute distress.  Eyes:      Extraocular Movements: Extraocular movements intact.   Pulmonary:      Effort: Pulmonary effort is normal.   Musculoskeletal:      Cervical back: Neck supple.   Neurological:      Mental Status: She is alert.   Psychiatric:         Mood and Affect: Mood normal.         Behavior: Behavior is cooperative.         Assessment/Plan   Problem List Items Addressed This Visit       Primary hypertension     Stable          Recurrent major depressive disorder, in remission (CMS-HCC)      Stable   no SI HI or self isolation   engages with others   Zoloft   monitor         Stage 3b chronic kidney disease (Multi) - Primary     Monitor renal fxn         Insomnia     Melatonin prn           Medications, treatments, and labs reviewed  Continue medications and treatments as listed in EMR    Scribe Attestation  I, Solomon Holguin   attest that this documentation has been prepared under the direction and in the presence of REMA Sesay.    Provider Attestation - Scribe documentation  All medical record entries made by the Scribe were at my direction and personally dictated by me. I have reviewed the chart and agree that the record accurately reflects my personal performance of the history, physical exam, discussion and plan.    REMA Sesay

## 2024-12-24 ENCOUNTER — NURSING HOME VISIT (OUTPATIENT)
Dept: POST ACUTE CARE | Facility: EXTERNAL LOCATION | Age: 84
End: 2024-12-24
Payer: COMMERCIAL

## 2024-12-24 DIAGNOSIS — F41.9 ANXIETY: Primary | ICD-10-CM

## 2024-12-24 DIAGNOSIS — I10 PRIMARY HYPERTENSION: ICD-10-CM

## 2024-12-24 DIAGNOSIS — G47.00 INSOMNIA, UNSPECIFIED TYPE: ICD-10-CM

## 2024-12-24 DIAGNOSIS — N18.32 STAGE 3B CHRONIC KIDNEY DISEASE (MULTI): ICD-10-CM

## 2024-12-24 PROCEDURE — 99309 SBSQ NF CARE MODERATE MDM 30: CPT | Performed by: REGISTERED NURSE

## 2024-12-24 NOTE — LETTER
Patient: Brooke Lam  : 1940    Encounter Date: 2024    PROGRESS NOTE    Subjective  Chief complaint: Brooke Lam is a 84 y.o. female patient being seen and evaluated for monthly general medical care and follow-up    HPI:  Patient presents for general medical care and f/u.  Patient seen and examined at bedside.  No issues per nursing.  Patient has no acute complaints.        Objective  Vital signs: 120/72, 98.1, 78, 18, 102.0, 99%    Physical Exam  Constitutional:       General: She is not in acute distress.  Eyes:      Extraocular Movements: Extraocular movements intact.   Pulmonary:      Effort: Pulmonary effort is normal.   Musculoskeletal:      Cervical back: Neck supple.   Neurological:      Mental Status: She is alert.   Psychiatric:         Mood and Affect: Mood normal.         Behavior: Behavior is cooperative.         Assessment/Plan  Problem List Items Addressed This Visit       Primary hypertension     Stable          Anxiety - Primary     ativan         Stage 3b chronic kidney disease (Multi)     Monitor renal fxn         Insomnia     Melatonin prn           Medications, treatments, and labs reviewed  Continue medications and treatments as listed in EMR    Scribe Attestation  IMaria De Jesus Scribe   attest that this documentation has been prepared under the direction and in the presence of REMA Sesay.    Provider Attestation - Scribe documentation  All medical record entries made by the Scribe were at my direction and personally dictated by me. I have reviewed the chart and agree that the record accurately reflects my personal performance of the history, physical exam, discussion and plan.    REMA Sesay      Electronically Signed By: REMA Sesay   24 11:21 AM

## 2024-12-26 NOTE — PROGRESS NOTES
PROGRESS NOTE    Subjective   Chief complaint: Brooke Lam is a 84 y.o. female patient being seen and evaluated for monthly general medical care and follow-up    HPI:  Patient presents for general medical care and f/u.  Patient seen and examined at bedside.  No issues per nursing.  Patient has no acute complaints.        Objective   Vital signs: 120/72, 98.1, 78, 18, 102.0, 99%    Physical Exam  Constitutional:       General: She is not in acute distress.  Eyes:      Extraocular Movements: Extraocular movements intact.   Pulmonary:      Effort: Pulmonary effort is normal.   Musculoskeletal:      Cervical back: Neck supple.   Neurological:      Mental Status: She is alert.   Psychiatric:         Mood and Affect: Mood normal.         Behavior: Behavior is cooperative.         Assessment/Plan   Problem List Items Addressed This Visit       Primary hypertension     Stable          Anxiety - Primary     ativan         Stage 3b chronic kidney disease (Multi)     Monitor renal fxn         Insomnia     Melatonin prn           Medications, treatments, and labs reviewed  Continue medications and treatments as listed in EMR    Scribe Attestation  IMaria De Jesus Scribe   attest that this documentation has been prepared under the direction and in the presence of REMA Sesay.    Provider Attestation - Scribe documentation  All medical record entries made by the Scribe were at my direction and personally dictated by me. I have reviewed the chart and agree that the record accurately reflects my personal performance of the history, physical exam, discussion and plan.    REMA Sesay